# Patient Record
Sex: FEMALE | Race: WHITE | Employment: UNEMPLOYED | URBAN - NONMETROPOLITAN AREA
[De-identification: names, ages, dates, MRNs, and addresses within clinical notes are randomized per-mention and may not be internally consistent; named-entity substitution may affect disease eponyms.]

---
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
## 2020-01-23 ENCOUNTER — HOSPITAL ENCOUNTER (OUTPATIENT)
Dept: PSYCHIATRY | Age: 20
Setting detail: THERAPIES SERIES
Discharge: HOME | End: 2020-01-23
Payer: MEDICARE

## 2020-01-23 PROCEDURE — 90792 PSYCH DIAG EVAL W/MED SRVCS: CPT | Performed by: PSYCHIATRY & NEUROLOGY

## 2020-01-23 NOTE — H&P
Department of Psychiatry  5 [facility]patient Services   Psychiatric Intake Assessment     DSM-5 Diagnosis: Major depressive disorder, single episode, severe without psychotic features  Generalized anxiety disorder with panic attacks  PTSD  Referring Physician: Self/4E      CHIEF COMPLAINT:  Depression, anxiety    HISTORY OF PRESENT ILLNESS:    Jacob Lowery is a 23 y.o. female with a history of MDD and NARINDER who presents to [address] [place] [address],[address] for assessment with potential to attend programming. \"I dont know Shaheen Dominguez just been having a lot of suicidal thoughts. \" States the suicidal thoughts started a couple of days ago. \"Zay hasnt been working out with me. I needed someone to talk to. \" She reports that she completed her intake assessment with [facility] but has not followed up since. Reports she has suicidal thoughts everyday. \"I dont want to attempt or anything itll just pop up. \" Denies plan or intent. \"I usually follow up with coping skills and stuff. I try to shower, talk to someone or listen to music. \" She was discharged from  12/22/2019. Reports she has not been feeling as depressed lately. Feels depressed occassionally but its easy to manage. \"Anxiety is not as bad but I haven't had any panic attacks. \" Sleep has been \"crappy. \" Trouble falling and staying asleep. Concentration good. \"Plenty of motivation to do things. \" Denies anhedonia. \"Im able to push through and do things I want. I noticed Shaheen Dominguez been drawing more than I used to. \" When asked about how her appetite has been, \"I dont know if its the medication or not but I will be really freaking hungry but my stomach wont. \" Is currently on Abilify and Buspar. Endorses feelings of worthlessness, sometimes hopelessness, and a lot of helplessness. \"Trying to become more independent\" to reduce the helplessness. Reports good medication compliance. When asked about side effects, \"nausea and dizziness.  When I take my medications Ill feel dizzy like it starts in my face and it goes out. Like tingly. I feel it like 15 or 30 minutes after I take my medication. \" Denies alcohol, tobacco and illicit drug use. \"Im feeling kind of depressed today. \" Reports fleeting suicidal ideation today \"but im pushing them out. \" Denies intent or plan. Denies homicidal ideation. Denies hallucinations. No evidence of delusions on examination. PSYCHIATRIC HISTORY:      · Outpatient psychiatric provider:  Connected to [facility]  · Suicide attempts: Yes- overdosing and cutting   · Inpatient psychiatric admissions: Yes- was last admitted to  12/20/2019    Past psychiatric medications includes: \"Corinne tried a lot\"  Adverse reactions from psychotropic medications:    Denies      Lifetime Psychiatric Review of Systems      ·    Obsessions and Compulsions: denies  ·    Mary Alice or Hypomania: denies  ·    Hallucinations: denies  ·    Panic Attacks:  yes  ·    Delusions:  denies  ·    Phobias: denies     Past Medical History:        Diagnosis Date    Anxiety     Depression     PTSD (post-traumatic stress disorder)        Past Surgical History:    No past surgical history on file. Current Meds    Current Outpatient Medications:     ARIPiprazole (ABILIFY) 2 MG tablet, Take 1 tablet by mouth daily, Disp: 30 tablet, Rfl: 0    busPIRone (BUSPAR) 5 MG tablet, Take 1 tablet by mouth 2 times daily, Disp: 60 tablet, Rfl: 0     Allergies:  Dill oil    Social History:     · RESIDENCE:  [facility] with brother  · LEVEL OF EDUCATION:  Graduated high school  · MARITAL STATUS: Single   · CHILDREN: None  · OCCUPATION: Currently unemployed. Brother for financial support  · SUBSTANCE ABUSE: Denies alcohol, tobacco and illicit drug use    Support system available to sustain/maintain patient outside of IOP includes: My friend Chio Rodriguez.  My brother and my mom    Family Medical and Psychiatric History:           Problem Relation Age of Onset    Anxiety Disorder Mother     Diabetes Father     Depression Brother        OBJECTIVE:     Review of systems:   CONSTITUTIONAL: fever, chills, diaphoresis, Denies: fever  EYES: Denies: blurry vision, decreased vision  ENT: Denies: nasal congestion, tinnitus  CARDIOVASCULAR: Denies: chest pain, palpitations  RESPIRATORY: Denies: cough, shortness of breath, wheezing  HEME-LYMPH: Denies: swollen lymph nodes  GI: Denies: nausea, vomiting, diarrhea  : Denies: frequency/urgency  NEURO: Denies: dizzy/vertigo, headache, numbness/tingling  MUSCULOSKELETAL: Denies: joint pain, joint swelling  SKIN: Denies: rash      Physical Exam:   Constitutional:  Well-developed and well-nourished, in no acute distress. HENT:   Head: Normocephalic and atraumatic. Ears: Normal external ear bilaterally  Eyes: Conjunctivae are normal. No occular discharge bilaterally  Skin: Warm and dry, no diaphoresis  Neck: Normal range of motion. Neck supple. No JVD present. Pulmonary: lungs clear to auscultation bilaterally without wheezing, rales, or rhonchi, no accessory muscle use. Musculoskeletal: spontaneous movement of all extremities with full range of motion  Neurological: Cranial nerves II-XII in tact. Normal gait and station     Mental Status Examination:    Level of consciousness:  Within normal limits  Appearance: Street clothes, seated in chair, fair grooming hygiene  Behavior/Motor: No abnormalities noted  Attitude toward examiner: Cooperative, attentive, good eye contact  Speech:  Spontaneous, normal rate and volume  Mood:  Dysthymic  Affect:  Mood congruent  Thought processes:  Linear, goal directed, coherent  Thought content: denies homicidal ideation  Suicidal Ideation:  Fleeting suicidal ideation.  Denies intent or plan  Delusions:  no evidence of delusions  Perceptual Disturbance:  denies any perceptual disturbance  Cognition:  In tact  Memory: age appropriate  Insight & Judgement: fair  Medication side effects:  Nausea and dizziness      DSM-5 DIAGNOSIS:    Major depressive disorder, single episode, severe without psychotic features  Generalized anxiety disorder with panic attacks  PTSD     Psychosocial and contextual stressors:       Past Medical History:   Diagnosis Date    Anxiety     Depression     PTSD (post-traumatic stress disorder)           PLAN      Admit to [address] Intensive Outpatient Program       FOLLOW UP/MED MANAGEMENT    Continue to follow up with [facility] for medication management    Patient will attend up to three days per week, up to one group service per day, up to one individual therapy session per day, and up to two psychoeducation/RT groups per day. Patient unable to benefit from a less intensive outpatient program due to requiring more structured environment, and severity of symptoms. Provider has reasonable expectation that patient will make a timely and significant practical improvement in the presenting acute symptoms as a result of the B[facility]OP because patient displays intent to engage in program and is intellectually capable of processing and subsequently adopting the skills and lessons taught in [address]. Patient would also benefit from more frequent medication management than would get on total outpatient level of care. History obtained from:  patient, electronic medical record, [address] treatment team      Behavioral Services  Medicare Certification for Outpatient Psychiatric Services    I certify that this patient's outpatient psychiatric services are medically necessary for:     X (1) Treatment which could reasonably be expected to improve this patient's condition,      X (2) Or for diagnostic study;     AND    X (2) The psychiatric services are provided while the individual is under the care of a physician and are included in the individualized plan of care.     In addition, I certify that this patient would otherwise require inpatient care in the absence of continued stay in the outpatient treatment program.    Electronically signed by Abril Allen PA-C on 1/23/2020 at 11:15 AM                                     Psychiatry Attending Attestation     I assessed this patient and reviewed the case and plan of care with Abril Allen PA-C. I have reviewed the above documentation and I agree with the findings and treatment plan with the following updates. Patient is a 24 y/o single  female with history of depression, recently discharged from , presented today with an intent to attend the program.  Patient reports feeling down and low for more days than not. Endorses anhedonia. Patient notes her depression has been worsening for last few weeks . Patient identifies stressors as recurrent suicidal thoughts. Patient reports poor sleep and appetite. Patient reports having trouble falling asleep. Patient reports poor concentration and energy. Patient endorses feelings of helplessness, hopelessness and worthlessness. She denies any psychotic symptoms. She denies any auditory or visual hallucinations. She would benefit from attending IOP as a step down and to identify triggers and learn coping strategies. Will continue to follow up with her during groups. Electronically signed by Jos Presley MD on 2/2/20 at 8:57 PM    **This report has been created using voice recognition software. It may contain minor errors which are inherent in voice recognition technology. **

## 2020-01-24 ENCOUNTER — HOSPITAL ENCOUNTER (OUTPATIENT)
Dept: PSYCHIATRY | Age: 20
Setting detail: THERAPIES SERIES
Discharge: HOME | End: 2020-01-24
Payer: MEDICARE

## 2020-01-24 PROCEDURE — H2020 THER BEHAV SVC, PER DIEM: HCPCS

## 2020-01-24 NOTE — GROUP NOTE
Group Therapy Note    Date: 1/24/2020    Group Start Time: 10:00 AM  Group End Time: 11:00 AM  Group Topic: Cognitive Skills    [address]  Therapy Note    Attendees: 5         Patient's Goal:  To participate in the group process and get support from peers. Notes:  Patient participated in the group journal and icebreaker exercises. Patient was able to identify positive qualities and a coping skill to use for the day. Status After Intervention:  Improved    Participation Level:  Active Listener and Interactive    Participation Quality: Appropriate, Attentive, Sharing and Supportive      Speech:  normal      Thought Process/Content: Logical      Affective Functioning: Congruent      Mood: euthymic      Level of consciousness:  Alert, Oriented x4 and Attentive      Response to Learning: Able to verbalize current knowledge/experience, Capable of insight and Progressing to goal      Endings: None Reported    Modes of Intervention: Education, Support and Socialization      Discipline Responsible: /Counselor      Signature:  Meeta Hanna, [facility]

## 2020-01-31 ENCOUNTER — HOSPITAL ENCOUNTER (OUTPATIENT)
Dept: PSYCHIATRY | Age: 20
Setting detail: THERAPIES SERIES
Discharge: HOME | End: 2020-01-31
Payer: MEDICARE

## 2020-01-31 PROCEDURE — H2020 THER BEHAV SVC, PER DIEM: HCPCS
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
## 2020-01-31 NOTE — GROUP NOTE
Group Therapy Note    Date: 1/31/2020    Group Start Time: 10:00 AM  Group End Time: 11:00 AM  Group Topic: Cognitive Skills    [address] Therapy Note    Attendees: 5         Patient's Goal:  To explore the topic of boundaries. Notes:  Patient discussed quotes related to the topic, participated in the group journal exercise, and participated in [place] boundary exploration activity. Status After Intervention:  Improved    Participation Level:  Active Listener and Interactive    Participation Quality: Appropriate, Attentive, Sharing and Supportive      Speech:  normal      Thought Process/Content: Logical      Affective Functioning: Congruent      Mood: euthymic      Level of consciousness:  Alert, Oriented x4 and Attentive      Response to Learning: Able to verbalize current knowledge/experience, Capable of insight and Progressing to goal      Endings: None Reported    Modes of Intervention: Education, Support and Socialization      Discipline Responsible: /Counselor      Signature:  Christopher Larsen [facility]

## 2020-07-16 ENCOUNTER — HOSPITAL ENCOUNTER (INPATIENT)
Age: 20
LOS: 4 days | Discharge: HOME | DRG: 751 | End: 2020-07-20
Attending: PSYCHIATRY & NEUROLOGY | Admitting: PSYCHIATRY & NEUROLOGY
Payer: MEDICARE

## 2020-07-16 PROBLEM — F32.9 MAJOR DEPRESSION, CHRONIC: Status: ACTIVE | Noted: 2020-07-16

## 2020-07-16 LAB
ACETAMINOPHEN LEVEL: < 5 UG/ML (ref 0–20)
ALBUMIN SERPL-MCNC: 4.4 G/DL (ref 3.5–5.1)
ALP BLD-CCNC: 92 U/L (ref 38–126)
ALT SERPL-CCNC: 36 U/L (ref 11–66)
ANION GAP SERPL CALCULATED.3IONS-SCNC: 12 MEQ/L (ref 8–16)
AST SERPL-CCNC: 24 U/L (ref 5–40)
BACTERIA: ABNORMAL
BASOPHILS # BLD: 0.4 %
BASOPHILS ABSOLUTE: 0.1 THOU/MM3 (ref 0–0.1)
BILIRUB SERPL-MCNC: 0.2 MG/DL (ref 0.3–1.2)
BILIRUBIN DIRECT: < 0.2 MG/DL (ref 0–0.3)
BILIRUBIN URINE: NEGATIVE
BLOOD, URINE: NEGATIVE
BUN BLDV-MCNC: 13 MG/DL (ref 7–22)
CALCIUM SERPL-MCNC: 9.2 MG/DL (ref 8.5–10.5)
CASTS: ABNORMAL /LPF
CASTS: ABNORMAL /LPF
CHARACTER, URINE: CLEAR
CHLORIDE BLD-SCNC: 99 MEQ/L (ref 98–111)
CO2: 27 MEQ/L (ref 23–33)
COLOR: YELLOW
CREAT SERPL-MCNC: 0.7 MG/DL (ref 0.4–1.2)
CRYSTALS: ABNORMAL
EOSINOPHIL # BLD: 0.6 %
EOSINOPHILS ABSOLUTE: 0.1 THOU/MM3 (ref 0–0.4)
EPITHELIAL CELLS, UA: ABNORMAL /HPF
ERYTHROCYTE [DISTWIDTH] IN BLOOD BY AUTOMATED COUNT: 12.6 % (ref 11.5–14.5)
ERYTHROCYTE [DISTWIDTH] IN BLOOD BY AUTOMATED COUNT: 39.1 FL (ref 35–45)
ETHYL ALCOHOL, SERUM: < 0.01 %
GLUCOSE BLD-MCNC: 96 MG/DL (ref 70–108)
GLUCOSE, URINE: NEGATIVE MG/DL
HCT VFR BLD CALC: 40.9 % (ref 37–47)
HEMOGLOBIN: 13.1 GM/DL (ref 12–16)
IMMATURE GRANS (ABS): 0.04 THOU/MM3 (ref 0–0.07)
IMMATURE GRANULOCYTES: 0.3 %
KETONES, URINE: NEGATIVE
LEUKOCYTE EST, POC: ABNORMAL
LYMPHOCYTES # BLD: 20.9 %
LYMPHOCYTES ABSOLUTE: 2.6 THOU/MM3 (ref 1–4.8)
MAGNESIUM: 1.8 MG/DL (ref 1.6–2.4)
MCH RBC QN AUTO: 27.5 PG (ref 26–33)
MCHC RBC AUTO-ENTMCNC: 32 GM/DL (ref 32.2–35.5)
MCV RBC AUTO: 85.9 FL (ref 81–99)
MISCELLANEOUS LAB TEST RESULT: ABNORMAL
MONOCYTES # BLD: 6.3 %
MONOCYTES ABSOLUTE: 0.8 THOU/MM3 (ref 0.4–1.3)
NITRITE, URINE: NEGATIVE
NUCLEATED RED BLOOD CELLS: 0 /100 WBC
OSMOLALITY CALCULATION: 275.7 MOSMOL/KG (ref 275–300)
PH UA: 8 (ref 5–9)
PLATELET # BLD: 357 THOU/MM3 (ref 130–400)
PMV BLD AUTO: 9.6 FL (ref 9.4–12.4)
POTASSIUM REFLEX MAGNESIUM: 3.3 MEQ/L (ref 3.5–5.2)
PREGNANCY, URINE: NEGATIVE
PROTEIN UA: NEGATIVE MG/DL
RBC # BLD: 4.76 MILL/MM3 (ref 4.2–5.4)
RBC URINE: ABNORMAL /HPF
RENAL EPITHELIAL, UA: ABNORMAL
SALICYLATE, SERUM: < 0.3 MG/DL (ref 2–10)
SEG NEUTROPHILS: 71.5 %
SEGMENTED NEUTROPHILS ABSOLUTE COUNT: 9 THOU/MM3 (ref 1.8–7.7)
SODIUM BLD-SCNC: 138 MEQ/L (ref 135–145)
SPECIFIC GRAVITY UA: 1.01 (ref 1–1.03)
TOTAL PROTEIN: 7.5 G/DL (ref 6.1–8)
UROBILINOGEN, URINE: 0.2 EU/DL (ref 0–1)
WBC # BLD: 12.6 THOU/MM3 (ref 4.8–10.8)
WBC UA: ABNORMAL /HPF
YEAST: ABNORMAL

## 2020-07-16 PROCEDURE — 1240000000 HC EMOTIONAL WELLNESS R&B

## 2020-07-16 PROCEDURE — 36415 COLL VENOUS BLD VENIPUNCTURE: CPT

## 2020-07-16 PROCEDURE — 81001 URINALYSIS AUTO W/SCOPE: CPT

## 2020-07-16 PROCEDURE — 85025 COMPLETE CBC W/AUTO DIFF WBC: CPT

## 2020-07-16 PROCEDURE — 83735 ASSAY OF MAGNESIUM: CPT

## 2020-07-16 PROCEDURE — 80048 BASIC METABOLIC PNL TOTAL CA: CPT

## 2020-07-16 PROCEDURE — G0480 DRUG TEST DEF 1-7 CLASSES: HCPCS

## 2020-07-16 PROCEDURE — 80076 HEPATIC FUNCTION PANEL: CPT

## 2020-07-16 PROCEDURE — 81025 URINE PREGNANCY TEST: CPT

## 2020-07-16 PROCEDURE — 99284 EMERGENCY DEPT VISIT MOD MDM: CPT

## 2020-07-16 PROCEDURE — 6370000000 HC RX 637 (ALT 250 FOR IP): Performed by: PSYCHIATRY & NEUROLOGY

## 2020-07-16 RX ORDER — POTASSIUM CHLORIDE 750 MG/1
40 TABLET, FILM COATED, EXTENDED RELEASE ORAL ONCE
Status: DISCONTINUED | OUTPATIENT
Start: 2020-07-16 | End: 2020-07-20 | Stop reason: HOSPADM

## 2020-07-16 RX ORDER — IBUPROFEN 200 MG
400 TABLET ORAL EVERY 6 HOURS PRN
Status: DISCONTINUED | OUTPATIENT
Start: 2020-07-16 | End: 2020-07-20 | Stop reason: HOSPADM

## 2020-07-16 RX ORDER — HYDROXYZINE HYDROCHLORIDE 25 MG/1
50 TABLET, FILM COATED ORAL 3 TIMES DAILY PRN
Status: DISCONTINUED | OUTPATIENT
Start: 2020-07-16 | End: 2020-07-20 | Stop reason: HOSPADM

## 2020-07-16 RX ORDER — FLUOXETINE HYDROCHLORIDE 20 MG/1
20 CAPSULE ORAL DAILY
Status: ON HOLD | COMMUNITY
End: 2020-11-18 | Stop reason: SDUPTHER

## 2020-07-16 RX ORDER — ACETAMINOPHEN 325 MG/1
650 TABLET ORAL EVERY 4 HOURS PRN
Status: DISCONTINUED | OUTPATIENT
Start: 2020-07-16 | End: 2020-07-20 | Stop reason: HOSPADM

## 2020-07-16 RX ORDER — MAGNESIUM HYDROXIDE/ALUMINUM HYDROXICE/SIMETHICONE 120; 1200; 1200 MG/30ML; MG/30ML; MG/30ML
30 SUSPENSION ORAL EVERY 6 HOURS PRN
Status: DISCONTINUED | OUTPATIENT
Start: 2020-07-16 | End: 2020-07-20 | Stop reason: HOSPADM

## 2020-07-16 RX ORDER — GINSENG 100 MG
CAPSULE ORAL ONCE
Status: DISCONTINUED | OUTPATIENT
Start: 2020-07-16 | End: 2020-07-20 | Stop reason: HOSPADM

## 2020-07-16 RX ORDER — CEPHALEXIN 250 MG/1
500 CAPSULE ORAL ONCE
Status: DISCONTINUED | OUTPATIENT
Start: 2020-07-16 | End: 2020-07-20 | Stop reason: HOSPADM

## 2020-07-16 RX ORDER — TRAZODONE HYDROCHLORIDE 50 MG/1
50 TABLET ORAL NIGHTLY PRN
Status: DISCONTINUED | OUTPATIENT
Start: 2020-07-16 | End: 2020-07-20 | Stop reason: HOSPADM

## 2020-07-16 RX ADMIN — TRAZODONE HYDROCHLORIDE 50 MG: 50 TABLET ORAL at 22:04

## 2020-07-16 ASSESSMENT — SLEEP AND FATIGUE QUESTIONNAIRES
DIFFICULTY STAYING ASLEEP: YES
DIFFICULTY FALLING ASLEEP: NO
DIFFICULTY STAYING ASLEEP: NO
DIFFICULTY FALLING ASLEEP: NO
SLEEP PATTERN: DISTURBED/INTERRUPTED SLEEP
AVERAGE NUMBER OF SLEEP HOURS: 5
DO YOU USE A SLEEP AID: COMMENT
DO YOU HAVE DIFFICULTY SLEEPING: NO
DIFFICULTY ARISING: NO
DO YOU USE A SLEEP AID: NO
DO YOU HAVE DIFFICULTY SLEEPING: YES
SLEEP PATTERN: NORMAL
AVERAGE NUMBER OF SLEEP HOURS: 6
RESTFUL SLEEP: YES
RESTFUL SLEEP: NO
DIFFICULTY ARISING: NO

## 2020-07-16 ASSESSMENT — PATIENT HEALTH QUESTIONNAIRE - PHQ9
SUM OF ALL RESPONSES TO PHQ QUESTIONS 1-9: 23
SUM OF ALL RESPONSES TO PHQ QUESTIONS 1-9: 24

## 2020-07-16 ASSESSMENT — LIFESTYLE VARIABLES: HISTORY_ALCOHOL_USE: NO
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
## 2020-07-16 NOTE — ED NOTES
Patient placed in safe room that is ligature resistant with continuous monitoring in place. Provider notified, requested an assessment by behavioral health . Patient belongings secured in a locked lockers outside of the room. Explained suicide prevention precautions to the patient including constant observer.         Seamus Elizabeth, [place]  06/47/53 [id number]
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
## 2020-07-16 NOTE — ED TRIAGE NOTES
Pt to room 15. Pt updated on plan of care and verbalized understanding. Sitter at bedside for patient and staff safety.

## 2020-07-16 NOTE — PROGRESS NOTES
Behavioral Health   Admission Note     Admission Type:   Admission Type: Voluntary    Reason for admission:  Reason for Admission: depression    PATIENT STRENGTHS:  Strengths: Connection to output provider, No significant Physical Illness    Patient Strengths and Limitations:  Limitations: Difficulty problem solving/relies on others to help solve problems    Addictive Behavior:   Addictive Behavior  In the past 3 months, have you felt or has someone told you that you have a problem with:  : None  Do you have a history of Chemical Use?: No  Do you have a history of Alcohol Use?: No  Do you have a history of Street Drug Abuse?: No  Histroy of Prescripton Drug Abuse?: No    Medical Problems:   Past Medical History:   Diagnosis Date    Anxiety     Depression     PTSD (post-traumatic stress disorder)        Status EXAM:  Status and Exam  Normal: Yes  Facial Expression: Flat  Affect: Appropriate  Level of Consciousness: Alert  Mood:Normal: No  Mood: Depressed  Motor Activity:Normal: Yes  Interview Behavior: Cooperative  Preception: [place] to Person, [place] to Time, [place] to Place, [place] to Situation  Attention:Normal: Yes  Thought Processes: Circumstantial  Thought Content:Normal: Yes  Hallucinations: None  Delusions: No  Memory:Normal: Yes  Insight and Judgment: No  Insight and Judgment: Poor Insight  Present Suicidal Ideation: Yes  Present Homicidal Ideation: No    Pt admitted with followings belongings:  Dentures: None  Vision - Corrective Lenses: None  Hearing Aid: None  Jewelry: Earrings  Clothing: Footwear, Pants, Shirt  Were All Patient Medications Collected?: No  Other Valuables: Cell phone, [employer] 1923     Admission order obtained yes. Valuables sent home with n/a  Valuables placed in safe in security envelope, number:  [id number]. Patient's home medications were veirifed with [facility] . Patient oriented to surroundings and program expectations and copy of patient rights given.  Received admission packet: yes.  Consents reviewed, signed *yes Patient verbalize understanding:  yes. Patient education on precautions: yes           Patient screened positive for suicide risk on CSSR-S (\"yes\" to question #4, 5, OR 6)  yes. Physician notified of risk score. Orders received No  2 person skin assessment completed upon admission Refused . Explained patients right to have family, representative or physician notified of their admission. Patient has Declined for physician to be notified. Patient has Declined for family/representative to be notified. Provided pt with [employer] handout entitled \"Quitting Smoking. \"  Reviewed handout with pt addressing dangers of smoking, developing coping skills, and providing basic information about quitting. Pt response to counseling:  Does not smoke     Pt admitted from ER. Pt having SI . Pt sees DR Nakul Blair [facility]. Pt calm .  Pt acts younger than her age and clucthes a stuffed animal .           Phoebe Ceja RN

## 2020-07-16 NOTE — ED NOTES
ED to inpatient nurses report    Chief Complaint   Patient presents with    Suicidal      Present to ED from home with mother. LOC: alert and orientated to name, place, date  Vital signs   Vitals:    07/16/20 1502   BP: 133/78   Pulse: 93   Resp: 20   Temp: 98.6 °F (37 °C)   TempSrc: Oral   SpO2: 98%   Weight: 150 lb (68 kg)   Height: 5' 3\" (1.6 m)      Oxygen Baseline 98    Current needs required none Bipap/Cpap No  LDAs:    Mobility: Independent  Pending ED orders: none  Present condition: stable, patient is voluntary at this time.      Electronically signed by Jeffery Arnold LPN on 1/86/3671 at 4:12 PM       Jeffery Arnold LPN  41/68/23 [id number]

## 2020-07-16 NOTE — ED PROVIDER NOTES
[address]     Pt Name: Shirley Rausch  MRN: [id number]  [place] 2000  Date of evaluation: 7/16/2020  Provider: Yahir Barnes Dr       Chief Complaint   Patient presents with    Suicidal       Nurses Notes reviewed and I agree except as noted in the HPI. HISTORY OF PRESENT ILLNESS    Leeann Vital is a 23 y.o. female with a history of depression, self cutting, and previous suicide attempts, who presents to the emergency department from home for suicidal ideation. She came on her own but was encouraged by her mother and also felt like she needed to get help. She states that she has recently been cutting to relieve stress but also had thoughts that if she would be brave enough to cut deeply,\" that she could bleed out from her injury. She does have a history of suicide attempt by ingestion and states that \"I have also thought about overdosing on pills. \"  She states that she has been going through a lot recently although she does not want to elaborate on any of this. She states that her tetanus immunization is up-to-date. She denies chest pain, shortness of breath abdominal pain, or fevers. She denies alcohol use or substance abuse. She denies any homicidal ideation, delusions, hallucinations. She denies medical complaints or concerns at this time. This was her only reason for presentation to the emergency room today. HPI was provided by the patient. Triage notes and Nursing notes were reviewed by myself. Any discrepancies are addressed above. REVIEW OF SYSTEMS     Review of Systems     All pertinent systems were reviewed and are negative unless indicated in the HPI. PAST MEDICAL HISTORY    has a past medical history of Anxiety, Depression, and PTSD (post-traumatic stress disorder). SURGICAL HISTORY      has no past surgical history on file.     CURRENT MEDICATIONS       Previous Medications ARIPIPRAZOLE (ABILIFY) 2 MG TABLET    Take 1 tablet by mouth daily    BUSPIRONE (BUSPAR) 5 MG TABLET    Take 1 tablet by mouth 2 times daily       ALLERGIES     is allergic to dill oil. FAMILY HISTORY     She indicated that her mother is alive. She indicated that her father is alive. She indicated that her sister is alive. She indicated that her brother is alive. family history includes Anxiety Disorder in her mother; Depression in her brother; Diabetes in her father. SOCIAL HISTORY      reports that she has never smoked. She has never used smokeless tobacco. She reports that she does not drink alcohol or use drugs. PHYSICAL EXAM     INITIAL VITALS:  height is 5' 3\" (1.6 m) and weight is 150 lb (68 kg). Her oral temperature is 98.6 °F (37 °C). Her blood pressure is 133/78 and her pulse is 93. Her respiration is 20 and oxygen saturation is 98%. Physical Exam    General: Ralph Whitt is a well nourished female who is nontoxic in appearance and not in any acute distress. she is awake, alert and oriented. HEENT: The pupils are equal, round and reactive to light at 4-3 mm bilaterally. The sclera are clear and anicteric bilaterally. The conjunctiva are pink and without injection bilaterally. The oropharynx is clear with moist mucous membranes. There are no intraoral lesions. Neck: The neck is soft and supple without meningismus. There is no lymphadenopathy. There is no JVD. Pulmonary: The lung fields are clear to auscultation bilaterally with equal bibasilar air entry. The respiratory effort is nonlabored. Cardiac: There is a regular rate and rhythm without murmurs, rubs or gallops. Abdomen: The abdomen is soft, nontender, and nondistended. There is no appreciable organomegaly. Back: No midline tenderness or CVA tenderness. Extremities: Full range of motion in the upper and lower extremities bilaterally. 5/5 strength throughout.   Palpable dorsalis pedis pulses 2+ bilaterally in the lower extremities. Skin: There are superficial abrasions to the left volar forearm consistent with self cutting. No bleeding, gaping wound, or extensive laceration requiring repair. The skin is otherwise warm and dry. There is no rash. There is no edema. Neuro: Cranial nerves II-XII are grossly intact. Sensation is grossly intact. There is no focal neurologic deficit. Gait is without ataxia. Psych: Behavior and speech are appropriate. Normal insight. DIFFERENTIAL DIAGNOSIS:   Includes but is not limited to: Suicidal ideation, self cutting, depression, feelings of helplessness and hopelessness, other    DIAGNOSTIC RESULTS     EKG: All EKG's are interpreted by the Emergency Department Physician who either signs or Co-signs this chart in the absence of a cardiologist.  None    RADIOLOGY: non-plain film images(s) such as CT, Ultrasound and MRI are read by the radiologist.  Plain radiographic images are visualized and preliminarily interpreted by the emergency physician unless otherwise stated below. No orders to display         LABS:   Labs Reviewed   CBC WITH AUTO DIFFERENTIAL - Abnormal; Notable for the following components:       Result Value    WBC 12.6 (*)     MCHC 32.0 (*)     Segs Absolute 9.0 (*)     All other components within normal limits   BASIC METABOLIC PANEL W/ REFLEX TO MG FOR LOW K - Abnormal; Notable for the following components:    Potassium reflex Magnesium 3.3 (*)     All other components within normal limits   SALICYLATE LEVEL - Abnormal; Notable for the following components:    Salicylate, Serum < 0.3 (*)     All other components within normal limits   HEPATIC FUNCTION PANEL - Abnormal; Notable for the following components:     Total Bilirubin 0.2 (*)     All other components within normal limits   MICROSCOPIC URINALYSIS - Abnormal; Notable for the following components:    Leukocytes, UA MODERATE (*)     All other components within normal limits   PREGNANCY, URINE   ETHANOL   ACETAMINOPHEN LEVEL   ANION GAP   MAGNESIUM   OSMOLALITY         EMERGENCYDEPARTMENT COURSE AND MEDICAL DECISION MAKING:   Vitals:    Vitals:    07/16/20 1502   BP: 133/78   Pulse: 93   Resp: 20   Temp: 98.6 °F (37 °C)   TempSrc: Oral   SpO2: 98%   Weight: 150 lb (68 kg)   Height: 5' 3\" (1.6 m)         Pertinent Labs & Imaging studies reviewed. (See chart for details)           Controlled Substances Monitoring:     No flowsheet data found. Patient presents with suicidal ideation. She is recently engaged in self cutting and has superficial abrasion to the left volar forearm. They were cleansed and bacitracin was applied. Her tetanus immunization is already up-to-date. She has a urinary tract infection for which she will be treated with Keflex and a potassium of 3.3 for which she was given 40 mg of oral potassium. She is medically cleared at this time. She did not require any emergency medications. She is voluntary for admission. She will be treated for UTI in the inpatient setting. She has been accepted for admission under the care of Dr. Calos Mendoza as of 1605 hrs.         ED Medications administered this visit:   Medications   bacitracin ointment (has no administration in time range)   potassium chloride (KLOR-CON) extended release tablet 40 mEq (has no administration in time range)   cephALEXin (KEFLEX) capsule 500 mg (has no administration in time range)   acetaminophen (TYLENOL) tablet 650 mg (has no administration in time range)   ibuprofen (ADVIL;MOTRIN) tablet 400 mg (has no administration in time range)   hydrOXYzine (ATARAX) tablet 50 mg (has no administration in time range)   traZODone (DESYREL) tablet 50 mg (has no administration in time range)   magnesium hydroxide (MILK OF MAGNESIA) 400 MG/5ML suspension 30 mL (has no administration in time range)   aluminum & magnesium hydroxide-simethicone (MAALOX) 200-200-20 MG/5ML suspension 30 mL (has no administration in time range)             The patient was discussed in consultation with the attending physician who is amenable to my treatment plan for this patient. CRITICAL CARE:   None    CONSULTS:  MIGUEL/Psych    PROCEDURES:  None    FINAL IMPRESSION      1. Suicidal ideation    2. Acute cystitis without hematuria    3. Hypokalemia    4.  Deliberate self-cutting          DISPOSITION/PLAN   DISPOSITION Admitted 07/16/2020 04:13:32 PM            (Please note that portions of this note were completed with a voice recognition program.  Efforts were made to edit the dictations but occasionally words are mis-transcribed.)    Gracia Randle, [address]  07/16/20 8444

## 2020-07-17 PROBLEM — F32.9 MDD (MAJOR DEPRESSIVE DISORDER), SINGLE EPISODE: Status: RESOLVED | Noted: 2019-12-20 | Resolved: 2020-07-17

## 2020-07-17 PROBLEM — F41.1 GAD (GENERALIZED ANXIETY DISORDER): Chronic | Status: ACTIVE | Noted: 2020-07-17

## 2020-07-17 PROBLEM — F33.2 SEVERE RECURRENT MAJOR DEPRESSION WITHOUT PSYCHOTIC FEATURES (HCC): Chronic | Status: ACTIVE | Noted: 2020-07-17

## 2020-07-17 PROBLEM — F32.9 MAJOR DEPRESSION, CHRONIC: Status: RESOLVED | Noted: 2020-07-16 | Resolved: 2020-07-17

## 2020-07-17 PROBLEM — F43.10 PTSD (POST-TRAUMATIC STRESS DISORDER): Chronic | Status: ACTIVE | Noted: 2020-07-17

## 2020-07-17 PROCEDURE — 6370000000 HC RX 637 (ALT 250 FOR IP): Performed by: PSYCHIATRY & NEUROLOGY

## 2020-07-17 PROCEDURE — 1240000000 HC EMOTIONAL WELLNESS R&B

## 2020-07-17 RX ORDER — FLUOXETINE HYDROCHLORIDE 20 MG/1
20 CAPSULE ORAL DAILY
Status: DISCONTINUED | OUTPATIENT
Start: 2020-07-17 | End: 2020-07-20 | Stop reason: HOSPADM

## 2020-07-17 RX ORDER — BUSPIRONE HYDROCHLORIDE 5 MG/1
5 TABLET ORAL 3 TIMES DAILY
Status: DISCONTINUED | OUTPATIENT
Start: 2020-07-17 | End: 2020-07-20 | Stop reason: HOSPADM

## 2020-07-17 RX ORDER — ARIPIPRAZOLE 5 MG/1
5 TABLET ORAL NIGHTLY
Status: DISCONTINUED | OUTPATIENT
Start: 2020-07-17 | End: 2020-07-20 | Stop reason: HOSPADM

## 2020-07-17 RX ORDER — BUSPIRONE HYDROCHLORIDE 5 MG/1
5 TABLET ORAL 2 TIMES DAILY
Status: DISCONTINUED | OUTPATIENT
Start: 2020-07-17 | End: 2020-07-17

## 2020-07-17 RX ADMIN — TRAZODONE HYDROCHLORIDE 50 MG: 50 TABLET ORAL at 20:54

## 2020-07-17 RX ADMIN — BUSPIRONE HYDROCHLORIDE 5 MG: 5 TABLET ORAL at 20:54

## 2020-07-17 RX ADMIN — BUSPIRONE HYDROCHLORIDE 5 MG: 5 TABLET ORAL at 17:26

## 2020-07-17 RX ADMIN — ARIPIPRAZOLE 5 MG: 5 TABLET ORAL at 20:54

## 2020-07-17 RX ADMIN — FLUOXETINE HYDROCHLORIDE 20 MG: 20 CAPSULE ORAL at 09:46

## 2020-07-17 RX ADMIN — BUSPIRONE HYDROCHLORIDE 5 MG: 5 TABLET ORAL at 09:47

## 2020-07-17 ASSESSMENT — SLEEP AND FATIGUE QUESTIONNAIRES
DO YOU HAVE DIFFICULTY SLEEPING: NO
RESTFUL SLEEP: NO
DIFFICULTY ARISING: NO
DIFFICULTY STAYING ASLEEP: NO
AVERAGE NUMBER OF SLEEP HOURS: 6
DO YOU USE A SLEEP AID: NO
SLEEP PATTERN: DISTURBED/INTERRUPTED SLEEP
DIFFICULTY FALLING ASLEEP: NO

## 2020-07-17 ASSESSMENT — PAIN SCALES - GENERAL
PAINLEVEL_OUTOF10: 0
PAINLEVEL_OUTOF10: 0

## 2020-07-17 NOTE — H&P
disorders    Family history: positive for depression      Medical History   Allergies:  Dill oil   Past Medical History:   Diagnosis Date    Anxiety     Depression     PTSD (post-traumatic stress disorder)       History reviewed. No pertinent surgical history.   Neurologic Exam    Labs  Recent Results (from the past 72 hour(s))   Pregnancy, Urine    Collection Time: 07/16/20  2:50 PM   Result Value Ref Range    Pregnancy, Urine NEGATIVE NEGATIVE   Microscopic Urinalysis    Collection Time: 07/16/20  2:50 PM   Result Value Ref Range    Glucose, Urine NEGATIVE NEGATIVE mg/dl    Bilirubin Urine NEGATIVE NEGATIVE    Ketones, Urine NEGATIVE NEGATIVE    Specific Gravity, UA 1.010 1.002 - 1.03    Blood, Urine NEGATIVE NEGATIVE    pH, UA 8.0 5.0 - 9.0    Protein, UA NEGATIVE NEGATIVE mg/dl    Urobilinogen, Urine 0.2 0.0 - 1.0 eu/dl    Nitrite, Urine NEGATIVE NEGATIVE    Leukocytes, UA MODERATE (A) NEGATIVE    Color, UA YELLOW YELLOW-STR    Character, Urine CLEAR [id number]    RBC, UA 3-5 0-2/hpf /hpf    WBC, UA 10-15 0-4/hpf /hpf    Epithelial Cells, UA 5-10 3-5/hpf /hpf    Bacteria, UA MANY FEW/NONE S    Casts NONE SEEN NONE SEEN /lpf    Crystals NONE SEEN NONE SEEN    Renal Epithelial, UA NONE SEEN NONE SEEN    Yeast, UA NONE SEEN NONE SEEN    Casts NONE SEEN /lpf    Miscellaneous Lab Test Result NONE SEEN    CBC Auto Differential    Collection Time: 07/16/20  3:06 PM   Result Value Ref Range    WBC 12.6 (H) 4.8 - 10.8 thou/mm3    RBC 4.76 4.20 - 5.40 mill/mm3    Hemoglobin 13.1 12.0 - 16.0 gm/dl    Hematocrit 40.9 37.0 - 47.0 %    MCV 85.9 81.0 - 99.0 fL    MCH 27.5 26.0 - 33.0 pg    MCHC 32.0 (L) 32.2 - 35.5 gm/dl    RDW-CV 12.6 11.5 - 14.5 %    RDW-SD 39.1 35.0 - 45.0 fL    Platelets 545 334 - 005 thou/mm3    MPV 9.6 9.4 - 12.4 fL    Seg Neutrophils 71.5 %    Lymphocytes 20.9 %    Monocytes 6.3 %    Eosinophils 0.6 %    Basophils 0.4 %    Immature Granulocytes 0.3 %    Segs Absolute 9.0 (H) 1.8 - 7.7 thou/mm3 Lymphocytes Absolute 2.6 1.0 - 4.8 thou/mm3    Monocytes Absolute 0.8 0.4 - 1.3 thou/mm3    Eosinophils Absolute 0.1 0.0 - 0.4 thou/mm3    Basophils Absolute 0.1 0.0 - 0.1 thou/mm3    Immature Grans (Abs) 0.04 0.00 - 0.07 thou/mm3    nRBC 0 /100 wbc   Basic Metabolic Panel w/ Reflex to MG    Collection Time: 07/16/20  3:06 PM   Result Value Ref Range    Sodium 138 135 - 145 meq/L    Potassium reflex Magnesium 3.3 (L) 3.5 - 5.2 meq/L    Chloride 99 98 - 111 meq/L    CO2 27 23 - 33 meq/L    Glucose 96 70 - 108 mg/dL    BUN 13 7 - 22 mg/dL    CREATININE 0.7 0.4 - 1.2 mg/dL    Calcium 9.2 8.5 - 10.5 mg/dL   Ethanol    Collection Time: 07/16/20  3:06 PM   Result Value Ref Range    ETHYL ALCOHOL, SERUM < 1.87 9.64 %   Salicylate Level    Collection Time: 07/16/20  3:06 PM   Result Value Ref Range    Salicylate, Serum < 0.3 (L) 2.0 - 10.0 mg/dL   Acetaminophen level    Collection Time: 07/16/20  3:06 PM   Result Value Ref Range    Acetaminophen Level < 5.0 0.0 - 20.0 ug/mL   Hepatic Function Panel    Collection Time: 07/16/20  3:06 PM   Result Value Ref Range    Alb 4.4 3.5 - 5.1 g/dL    Total Bilirubin 0.2 (L) 0.3 - 1.2 mg/dL    Bilirubin, Direct <0.2 0.0 - 0.3 mg/dL    Alkaline Phosphatase 92 38 - 126 U/L    AST 24 5 - 40 U/L    ALT 36 11 - 66 U/L    Total Protein 7.5 6.1 - 8.0 g/dL   Anion Gap    Collection Time: 07/16/20  3:06 PM   Result Value Ref Range    Anion Gap 12.0 8.0 - 16.0 meq/L   Magnesium    Collection Time: 07/16/20  3:06 PM   Result Value Ref Range    Magnesium 1.8 1.6 - 2.4 mg/dL   Osmolality    Collection Time: 07/16/20  3:06 PM   Result Value Ref Range    Osmolality Calc 275.7 275.0 - 300 mOsmol/kg       SOCIAL HISTORY  Social History     Socioeconomic History    Marital status: Single     Spouse name: Not on file    Number of children: Not on file    Years of education: 12    Highest education level: Not on file   Occupational History    Not on file   Social Needs    Financial resource strain: Not on file    Food insecurity     Worry: Not on file     Inability: Not on file    Transportation needs     Medical: Not on file     Non-medical: Not on file   Tobacco Use    Smoking status: Never Smoker    Smokeless tobacco: Never Used   Substance and Sexual Activity    Alcohol use: No    Drug use: No    Sexual activity: Yes     Partners: Male, Female   Lifestyle    Physical activity     Days per week: Not on file     Minutes per session: Not on file    Stress: Not on file   Relationships    Social connections     Talks on phone: Not on file     Gets together: Not on file     Attends [employer]ism service: Not on file     Active member of club or organization: Not on file     Attends meetings of clubs or organizations: Not on file     Relationship status: Not on file    Intimate partner violence     Fear of current or ex partner: Not on file     Emotionally abused: Not on file     Physically abused: Not on file     Forced sexual activity: Not on file   Other Topics Concern    Not on file   Social History Narrative    Not on file       Review of systems  Constitutional:  negative for chills, fevers, sweats  Respiratory:  negative for cough, dyspnea on exertion, hemoptysis, shortness of breath, wheezing  Cardiovascular:  negative for chest pain, chest pressure/discomfort, lower extremity edema, palpitations  Gastrointestinal:  negative for abdominal pain, constipation, diarrhea, nausea, vomiting  Neurological:  negative for dizziness, headache    Mental Status  Pt. was alert, fully oriented, and cooperative. Appearance and hygiene wereappropriate, well-groomed . Mood was depressed. Affect was \"dysthymic and poorly reactive Thought process was linear and well-organized. Patient denied any hallucinations or paranoia. Patient endorsed suicidal ideations. Patient denied homicidal ideations . Patient's gross cognitive functions were intact. Insight and judgement were fair.  Both recent and remote memory were intact. Psychomotor status was without abnormality     Diagnostic Impression  Principal Problem:    Severe recurrent major depression without psychotic features (HCC)  Active Problems:    NARINDER (generalized anxiety disorder)    PTSD (post-traumatic stress disorder)  Resolved Problems:    Major depression, chronic      major depressive disorder recurrent severe      Medications   ARIPiprazole  5 mg Oral Nightly    FLUoxetine  20 mg Oral Daily    busPIRone  5 mg Oral TID    bacitracin   Topical Once    potassium chloride  40 mEq Oral Once    cephALEXin  500 mg Oral Once     acetaminophen, ibuprofen, hydrOXYzine, traZODone, magnesium hydroxide, aluminum & magnesium hydroxide-simethicone    Treatment Plan:    1. Admit to inpatient psychiatric treatment  2. Supportive therapy with medication management. Reviewed risks and benefits as well as potential side effects with patient. 3. Therapeutic activities and groups  4. Follow up at [facility] after symptoms stabilized    Estimated length of stay: 5-7 days    Tiburcio Klinefelter, MD  Psychiatrist.  Dragon voice recognition software used in portions of this document.  Occasionally words are mis-transcribed

## 2020-07-17 NOTE — PLAN OF CARE
Participates in care planning  7/17/2020 1122 by Mary Berger RN  Outcome: Ongoing  Note: Discussed tx plan with patient. Encouraged to attend groups . Compliant with medications   7/17/2020 0014 by Lizy Quinteros RN  Outcome: Ongoing  Note: Pt is taking medications, attending and participating in groups and care planning. Pt has good interaction with staff and peers      Problem: Activity:  Goal: Physical symptoms of sleep deprivation will improve  Description: Physical symptoms of sleep deprivation will improve  7/17/2020 1122 by Mary Berger RN  Outcome: Met This Shift  7/17/2020 0014 by Lizy Quinteros RN  Outcome: Ongoing  Note: Pt resting quietly with no distress noted  Goal: Sleeping patterns will improve  Description: Sleeping patterns will improve  7/17/2020 1122 by Mary Berger RN  Outcome: Met This Shift  7/17/2020 0014 by Lizy Quinteros RN  Outcome: Ongoing  Note: Pt resting quietly with no distress noted     Problem: Discharge Planning:  Goal: Discharged to appropriate level of care  Description: Discharged to appropriate level of care  7/17/2020 1122 by Mary Berger RN  Outcome: Ongoing  Note: Plans to return home with roommates. Follow up with Nadeen Song  7/17/2020 0014 by Lizy Quinteros RN  Outcome: Ongoing  Note: Pt plans to return home with room mates and follow with Dr. Gianfranco Nevarez     Problem: KNOWLEDGE DEFICIT  Goal: Knowledge - personal safety  7/17/2020 1122 by Mary Begrer RN  Outcome: Ongoing  7/17/2020 0014 by Lizy Quinteros RN  Outcome: Ongoing  Note: Pt remains safe and free of harm     Problem: General Medical Problem-Behavioral Health  Goal: Absence of urinary tract infection signs and symptoms  Description: Absence of urinary tract infection signs and symptoms  Outcome: Ongoing  Note: Dr. Blank of urine results and antibiotic prescribed in the emergency room.   Goal: Hemodynamic stability will improve  Description: Hemodynamic stability will improve  Outcome: Ongoing   Care plan reviewed with patient . Patient  verbalize understanding of the plan of care and contribute to goal setting.

## 2020-07-17 NOTE — PLAN OF CARE
Problem: Suicide risk  Goal: Provide patient with safe environment  Description: Provide patient with safe environment  Outcome: Ongoing  Note: Pt remains safe and free of harm     Problem: Altered Mood, Depressive Behavior:  Goal: Able to verbalize support systems  Description: Able to verbalize support systems  Outcome: Ongoing  Note: Pt reports she has a positive support system  Goal: Absence of self-harm  Description: Absence of self-harm  Outcome: Ongoing  Note: Pt has fleeting thoughts with no plans or intent  Goal: Patient specific goal  Description: Patient specific goal  Outcome: Ongoing  Note: Pt working on goal of journaling  Goal: Participates in care planning  Description: Participates in care planning  Outcome: Ongoing  Note: Pt is taking medications, attending and participating in groups and care planning. Pt has good interaction with staff and peers      Problem:  Activity:  Goal: Physical symptoms of sleep deprivation will improve  Description: Physical symptoms of sleep deprivation will improve  Outcome: Ongoing  Note: Pt resting quietly with no distress noted  Goal: Sleeping patterns will improve  Description: Sleeping patterns will improve  Outcome: Ongoing  Note: Pt resting quietly with no distress noted     Problem: Discharge Planning:  Goal: Discharged to appropriate level of care  Description: Discharged to appropriate level of care  Outcome: Ongoing  Note: Pt plans to return home with room mates and follow with Dr. Olivier Vila     Problem: KNOWLEDGE DEFICIT  Goal: Knowledge - personal safety  Outcome: Ongoing  Note: Pt remains safe and free of harm     Problem: Altered Mood, Depressive Behavior:  Goal: Able to verbalize and/or display a decrease in depressive symptoms  Description: Able to verbalize and/or display a decrease in depressive symptoms  Outcome: Not Met This Shift  Note: Pt reports mild depression, brightens with interactions  Goal: Ability to disclose and discuss suicidal ideas will improve  Description: Ability to disclose and discuss suicidal ideas will improve  Outcome: Not Met This Shift  Note: Pt has fleeting thoughts with no plans or intent   Care plan reviewed with patient and  verbalize understanding of the plan of care and contribute to goal setting.

## 2020-07-17 NOTE — PATIENT CARE CONFERENCE
[facility]  Initial Interdisciplinary Treatment Plan NOTE    Review Date & Time: 7/17/2020 1:17pm    Patient was in treatment team.  See Multidisciplinary Treatment Team sheet for participants. Admission Type:   Admission Type: Voluntary    Reason for admission:  Reason for Admission: depression      Estimated Length of Stay Update:  3 days  Estimated Discharge Date Update: 3 days    PATIENT STRENGTHS:  Patient Strengths Strengths: Positive Support  Patient Strengths and Limitations:Limitations: Tendency to isolate self  Addictive Behavior:Addictive Behavior  In the past 3 months, have you felt or has someone told you that you have a problem with:  : None  Do you have a history of Chemical Use?: No  Do you have a history of Alcohol Use?: No  Do you have a history of Street Drug Abuse?: No  Histroy of Prescripton Drug Abuse?: No  Medical Problems:  Past Medical History:   Diagnosis Date    Anxiety     Depression     PTSD (post-traumatic stress disorder)        EDUCATION:   Learner Progress Toward Treatment Goals: Reviewed results and recommendations of this team, Reviewed group plan and strategies, Reviewed signs, symptoms and risk of self harm and violent behavior and Reviewed goals and plan of care    Method: Individual    Outcome: Verbalized understanding and Demonstrated Understanding    PATIENT GOALS: to adjust her medication    PLAN/TREATMENT RECOMMENDATIONS UPDATE:   1. What is the most important thing we can help you with while you are here? See goal  2. Who is your support system? Mother, boyfriend [facility], friend Jacinta Sandy  3. Do you have follow-up providers? [place]: Dr. Laquita Stinson and counselor Claudio Henry  4. Do you have the ability to pay for your medications? ye  5. Where will you be residing when you leave the hospital? With her boyfriend [facility]  6.  Will need a return to work slip or FMLA paper completion? denies      GOALS UPDATE:   Time frame for Short-Term Goals: daily/ongoing    [facility] Francisco Rabago, [facility]

## 2020-07-17 NOTE — PLAN OF CARE
Patient has participated in one group so far today so she is working toward her socialization goal for this shift.

## 2020-07-17 NOTE — BH NOTE
Group Therapy Note    Date: 7/17/2020  Start Time: 2000  End Time:  2020  Number of Participants: 1    Type of Group: Wrap-Up    Wellness Binder Information  Module Name:    Session Number:      Patient's Goal:  journal    Notes: Working on    Status After Intervention:  Unchanged    Participation Level: Active Listener    Participation Quality: Appropriate      Speech:  normal      Thought Process/Content: Logical      Affective Functioning: Flat      Mood: depressed      Level of consciousness:  Alert      Response to Learning: Able to verbalize current knowledge/experience      Endings: Suicidality    Modes of Intervention: Education      Discipline Responsible: Registered Nurse      Signature:   July Alexis RN

## 2020-07-17 NOTE — BH NOTE
PLAN OF CARE:     Start Time: 0900  End Time:  0930    Group Topic:  Daily Goals    Group Type:   Goal Group    Intervention/Goal:  To increase support and identify daily goals    Attendance:  Participated in group    Affect:   bright    Behavior: pleasant and cooperative     Response:  appropriate    Daily Goal:  To draw today.      Progress:  Progressing to goal

## 2020-07-18 PROCEDURE — 1240000000 HC EMOTIONAL WELLNESS R&B

## 2020-07-18 PROCEDURE — 6370000000 HC RX 637 (ALT 250 FOR IP): Performed by: PSYCHIATRY & NEUROLOGY

## 2020-07-18 PROCEDURE — 99231 SBSQ HOSP IP/OBS SF/LOW 25: CPT | Performed by: NURSE PRACTITIONER

## 2020-07-18 RX ADMIN — TRAZODONE HYDROCHLORIDE 50 MG: 50 TABLET ORAL at 21:08

## 2020-07-18 RX ADMIN — FLUOXETINE HYDROCHLORIDE 20 MG: 20 CAPSULE ORAL at 10:51

## 2020-07-18 RX ADMIN — BUSPIRONE HYDROCHLORIDE 5 MG: 5 TABLET ORAL at 15:40

## 2020-07-18 RX ADMIN — BUSPIRONE HYDROCHLORIDE 5 MG: 5 TABLET ORAL at 21:08

## 2020-07-18 RX ADMIN — BUSPIRONE HYDROCHLORIDE 5 MG: 5 TABLET ORAL at 10:52

## 2020-07-18 RX ADMIN — ARIPIPRAZOLE 5 MG: 5 TABLET ORAL at 21:08

## 2020-07-18 ASSESSMENT — PAIN SCALES - GENERAL: PAINLEVEL_OUTOF10: 0

## 2020-07-18 NOTE — PLAN OF CARE
Problem: Altered Mood, Depressive Behavior:  Goal: Able to verbalize and/or display a decrease in depressive symptoms  Description: Able to verbalize and/or display a decrease in depressive symptoms  7/17/2020 2241 by Antonella Castaneda RN  Outcome: Met This Shift  Note: Patient denies depressive symptoms. 7/17/2020 1122 by Lc Puri RN  Outcome: Ongoing  Note: Describes  mood as 6/10  Goal: Ability to disclose and discuss suicidal ideas will improve  Description: Ability to disclose and discuss suicidal ideas will improve  7/17/2020 2241 by Antonella Castaneda RN  Outcome: Met This Shift  Note: Patient denies suicidal thoughts this shift. 7/17/2020 1122 by Lc Puri RN  Outcome: Met This Shift  Goal: Absence of self-harm  Description: Absence of self-harm  7/17/2020 2241 by Antonella Castaneda RN  Outcome: Met This Shift  Note: Patient remains free from self-harm. 7/17/2020 1122 by Lc Puri RN  Outcome: Met This Shift     Problem: Activity:  Goal: Sleeping patterns will improve  Description: Sleeping patterns will improve  7/17/2020 2241 by Antonella Castaneda RN  Outcome: Met This Shift  Note: Patient slept 8 hours last night per report. 7/17/2020 1122 by Lc Puri RN  Outcome: Met This Shift     Problem: Altered Mood, Depressive Behavior:  Goal: Participates in care planning  Description: Participates in care planning  7/17/2020 2241 by Antonella Castaneda RN  Outcome: Ongoing  Note: Care plan reviewed with patient. Patient does verbalize understanding of the plan of care and does not contribute to goal setting . 7/17/2020 1122 by Lc Puri RN  Outcome: Ongoing  Note: Discussed tx plan with patient. Encouraged to attend groups .  Compliant with medications      Problem: Discharge Planning:  Goal: Discharged to appropriate level of care  Description: Discharged to appropriate level of care  7/17/2020 2241 by Antonella Castaneda RN  Outcome: Ongoing  Note: Discharge planning is in process. 7/17/2020 1122 by Christopher Smith RN  Outcome: Ongoing  Note: Plans to return home with roommates. Follow up with [facility]     Problem: Coping:  Goal: Ability to identify problematic behaviors that deter socialization will improve  Description: Ability to identify problematic behaviors that deter socialization will improve  Outcome: Ongoing     Problem: Altered Mood, Depressive Behavior:  Goal: Patient specific goal  Description: Patient specific goal  7/17/2020 2241 by Rola Berumen RN  Outcome: Not Met This Shift  Note: Patient did not have a goal for today. 7/17/2020 1122 by Christopher Smith RN  Outcome: Ongoing     Problem: KNOWLEDGE DEFICIT  Goal: Knowledge - personal safety  7/17/2020 2241 by Rola Berumen RN  Outcome: Not Met This Shift  Note: Patient has not established a safety plan for discharge. 7/17/2020 1122 by Christopher Smith RN  Outcome: Ongoing     Problem: Suicide risk  Description: Suicide risk  Goal: Provide patient with safe environment  Description: Provide patient with safe environment  7/17/2020 2241 by Rola Berumen RN  Outcome: Completed  Note: Patient is on 15 minute close observation  7/17/2020 1122 by Christopher Smith RN  Outcome: Met This Shift  Note: Patient checks done every 15 minutes. Patient encouraged to call for assistance to ambulate. Environmental hazards removed. Problem: Altered Mood, Depressive Behavior:  Goal: Able to verbalize support systems  Description: Able to verbalize support systems  7/17/2020 2241 by Rola Berumen RN  Outcome: Completed  Note: Patient reports that her mom and boyfriend are supportive. 7/17/2020 1122 by Christopher Smith RN  Outcome: Ongoing  Note: Mom and boyfriend     Problem:  Activity:  Goal: Physical symptoms of sleep deprivation will improve  Description: Physical symptoms of sleep deprivation will improve  7/17/2020 2241 by Rola Berumen RN  Outcome: Completed  7/17/2020 1122 by Christopher Smith RN  Outcome: Met This Shift     Problem: General Medical Problem-Behavioral Health  Goal: Absence of urinary tract infection signs and symptoms  Description: Absence of urinary tract infection signs and symptoms  7/17/2020 2241 by Jackeline Doherty RN  Outcome: Completed  7/17/2020 1122 by Martín Gruber RN  Outcome: Ongoing  Note:  Informed of urine results and antibiotic prescribed in the emergency room.   Goal: Hemodynamic stability will improve  Description: Hemodynamic stability will improve  7/17/2020 2241 by Jackeline Doherty RN  Outcome: Completed  7/17/2020 1122 by Martín Gruber RN  Outcome: Ongoing

## 2020-07-18 NOTE — GROUP NOTE
Group Therapy Note    Date: 7/18/2020    Group Start Time: 0900  Group End Time: 0930  Group Topic: [employer]    [facility] 4E    CANDELARIO Wynn        Group Therapy Note    Attendees: 4         Patient's Goal:  To journal today. Notes:  Pt is engaging in group therapy conversation appropriately and is noted to listen appropriately to her peers throughout conversation. Pt is noted to require increased verbal prompting from staff to participate in group therapy conversation. Pt appears flat and dismissive during group therapy conversation. Status After Intervention:  Unchanged    Participation Level:  Active Listener and Minimal    Participation Quality: Appropriate and Attentive      Speech:  hesitant      Thought Process/Content: Linear      Affective Functioning: Flat      Mood: dysphoric      Level of consciousness:  Alert, Oriented x4 and Attentive      Response to Learning: Able to verbalize current knowledge/experience, Able to retain information and Progressing to goal      Endings: None Reported    Modes of Intervention: Education, Support, Socialization, Exploration, Clarifying, Activity, Limit-setting and Reality-testing      Discipline Responsible: Psychoeducational Specialist      Signature:  Yris Nicole

## 2020-07-18 NOTE — PLAN OF CARE
Problem: Altered Mood, Depressive Behavior:  Goal: Able to verbalize and/or display a decrease in depressive symptoms  Description: Able to verbalize and/or display a decrease in depressive symptoms  Outcome: Ongoing  Note: Patient reports mood 8/10 with 10 being normal. Has blunt and flat, appears depressed affect. Speech clear . Good eye contact. Reports hope for future and identifies mom and boyfriend as his support system. Problem: Altered Mood, Depressive Behavior:  Goal: Ability to disclose and discuss suicidal ideas will improve  Description: Ability to disclose and discuss suicidal ideas will improve  Outcome: Ongoing  Note: Patient denies suicidal ideations, no plan or intent to harm self. Patient remains on suicidal precautions 15 checks for safety. Instructed to seek staff as needed for thoughts of self harm. Problem: Altered Mood, Depressive Behavior:  Goal: Absence of self-harm  Description: Absence of self-harm  Outcome: Ongoing  Note: No self harm behaviors were observed or reported so far this shift. Remains on every 15 minutes precautions for safety. Problem: Altered Mood, Depressive Behavior:  Goal: Patient specific goal  Description: Patient specific goal  Outcome: Ongoing  Note: Patient reports goal for today is to \"journal\". Patient continues to work towards goal.       Problem: Altered Mood, Depressive Behavior:  Goal: Participates in care planning  Description: Participates in care planning  Outcome: Ongoing  Note: Patient discussed treatment plan with physician/medical staff, attending group, and compliant with medications. Problem: Activity:  Goal: Sleeping patterns will improve  Description: Sleeping patterns will improve  Outcome: Ongoing  Note: Patient slept 8.5 hours last night, reports she slept well. Encourage patient not to take naps during the day, relax several hours before bed and to take prescribed sleep meds as ordered.  Patient verbalized understanding. Problem: Discharge Planning:  Goal: Discharged to appropriate level of care  Description: Discharged to appropriate level of care  Outcome: Ongoing  Note: Patient voices no needs before discharge. Patient plans to be discharged to home with room mate. Discharge planner working with patient to achieve optimal discharge plans, specific to individual needs. Problem: KNOWLEDGE DEFICIT  Goal: Knowledge - personal safety  Outcome: Ongoing  Note: Maintained in safe and secure environment. Patient did not fill out safety plan this shift. Care plan reviewed with patient. Patient verbalize understanding of the plan of care and contribute to goal setting.

## 2020-07-18 NOTE — PROGRESS NOTES
Group Therapy Note    Date: 7/17/2020  Start Time: 2000  End Time:  2020      Type of Group: Relaxation      Patient's Goal:  No goal set    Notes:  Patient was out for relaxation     Status After Intervention:  Improved    Participation Level:  Active Listener    Participation Quality: Appropriate and Attentive      Speech:  normal      Thought Process/Content: Logical      Affective Functioning: Congruent      Mood: anxious and depressed      Level of consciousness:  Alert and Oriented x4      Response to Learning: Able to verbalize current knowledge/experience      Endings: None Reported    Modes of Intervention: Support and Socialization      Discipline Responsible: Registered Nurse      Signature:  Giorgio Castillo RN

## 2020-07-18 NOTE — BH NOTE
Pt declined to attend [address] [employer] group therapy session. Pt was provided maximum verbal encouragement to attend group therapy session, pt remained resting in her room during time of group therapy session.

## 2020-07-19 PROCEDURE — 6370000000 HC RX 637 (ALT 250 FOR IP): Performed by: PSYCHIATRY & NEUROLOGY

## 2020-07-19 PROCEDURE — 1240000000 HC EMOTIONAL WELLNESS R&B

## 2020-07-19 PROCEDURE — 99231 SBSQ HOSP IP/OBS SF/LOW 25: CPT | Performed by: NURSE PRACTITIONER

## 2020-07-19 RX ADMIN — FLUOXETINE HYDROCHLORIDE 20 MG: 20 CAPSULE ORAL at 08:33

## 2020-07-19 RX ADMIN — TRAZODONE HYDROCHLORIDE 50 MG: 50 TABLET ORAL at 21:26

## 2020-07-19 RX ADMIN — BUSPIRONE HYDROCHLORIDE 5 MG: 5 TABLET ORAL at 14:31

## 2020-07-19 RX ADMIN — BUSPIRONE HYDROCHLORIDE 5 MG: 5 TABLET ORAL at 21:26

## 2020-07-19 RX ADMIN — ARIPIPRAZOLE 5 MG: 5 TABLET ORAL at 21:26

## 2020-07-19 RX ADMIN — BUSPIRONE HYDROCHLORIDE 5 MG: 5 TABLET ORAL at 08:33

## 2020-07-19 ASSESSMENT — PAIN SCALES - GENERAL
PAINLEVEL_OUTOF10: 0
PAINLEVEL_OUTOF10: 0

## 2020-07-19 NOTE — PATIENT CARE CONFERENCE
[address] [place] [address]  Day 3 Interdisciplinary Treatment Plan NOTE    Review Date & Time: 7/19/2020 12:43 PM    Patient was in treatment team    Admission Type:   Admission Type: Voluntary    Reason for admission:  Reason for Admission: depression  Estimated Length of Stay Update:  3-5 days   Estimated Discharge Date Update: 3-5 days     PATIENT STRENGTHS:  Patient Strengths Strengths: Positive Support  Patient Strengths and Limitations:Limitations: Tendency to isolate self  Addictive Behavior:Addictive Behavior  In the past 3 months, have you felt or has someone told you that you have a problem with:  : None  Do you have a history of Chemical Use?: No  Do you have a history of Alcohol Use?: No  Do you have a history of Street Drug Abuse?: No  Histroy of Prescripton Drug Abuse?: No  Medical Problems:  Past Medical History:   Diagnosis Date    Anxiety     Depression     PTSD (post-traumatic stress disorder)        Risk:  Fall RiskTotal: 53  Ivan Scale Ivan Scale Score: 21  BVC Total: 0    Status EXAM:   Status and Exam  Normal: No  Facial Expression: Flat, Brightened  Affect: Stable  Level of Consciousness: Alert  Mood:Normal: Yes  Mood: Depressed  Motor Activity:Normal: Yes  Interview Behavior: Cooperative  Preception: [place] to Person, [place] to Time, [place] to Place, [place] to Situation  Attention:Normal: Yes  Thought Processes: Circumstantial  Thought Content:Normal: Yes  Hallucinations: None  Delusions: No  Memory:Normal: Yes  Insight and Judgment: No  Insight and Judgment: Poor Judgment, Poor Insight  Present Suicidal Ideation: No  Present Homicidal Ideation: No    Daily Assessment Last Entry:   Daily Sleep (WDL): Within Defined Limits         Patient Currently in Pain: Denies  Daily Nutrition (WDL): Within Defined Limits    Patient Monitoring:  Frequency of Checks: 4 times per hour, close    Psychiatric Symptoms:   Depression Symptoms  Depression Symptoms: No problems reported or observed. Anxiety Symptoms  Anxiety Symptoms: No problems reported or observed. Mary Alice Symptoms  [place] Symptoms: No problems reported or observed. Psychosis Symptoms  Delusion Type: No problems reported or observed. Suicide Risk CSSR-S:  1) Within the past month, have you wished you were dead or wished you could go to sleep and not wake up? : Yes  2) Have you actually had any thoughts of killing yourself? : Yes  3) Have you been thinking about how you might kill yourself? : Yes  5) Have you started to work out or worked out the details of how to kill yourself? Do you intend to carry out this plan? : Yes  6) Have you ever done anything, started to do anything, or prepared to do anything to end your life?: Yes        EDUCATION:   Learner Progress Toward Treatment Goals: Reviewed results and recommendations of this team, Reviewed group plan and strategies, Reviewed signs, symptoms and risk of self harm and violent behavior and Reviewed goals and plan of care    Method: Individual    Outcome: Verbalized understanding and Demonstrated Understanding    PATIENT GOALS: Medications and counseling     PLAN/TREATMENT RECOMMENDATIONS UPDATE:  1. How are you progressing toward meeting your main treatment goal? I feel that I am doing better today and hope to go home. 2.  Are there discharge barriers/lingering problems that need to be addressed?  none      3. Do you have the ability to pay for your medications? Yes       4. How is your group participation? \"I have gone to groups. \"    GOALS UPDATE:   Time frame for Short-Term Goals: ongoing      [facility], [facility]

## 2020-07-19 NOTE — PLAN OF CARE
Problem: Altered Mood, Depressive Behavior:  Goal: Able to verbalize and/or display a decrease in depressive symptoms  Description: Able to verbalize and/or display a decrease in depressive symptoms  Outcome: Ongoing  Note: Patient reports mood 8/10 with 10 being normal. Has flat affect, brightens with interaction. Speech clear. Good eye contact. Reports hope for future and identifies mom and boyfriend as her support system. Problem: Altered Mood, Depressive Behavior:  Goal: Ability to disclose and discuss suicidal ideas will improve  Description: Ability to disclose and discuss suicidal ideas will improve  Outcome: Ongoing  Note: Patient denies suicidal ideations, no plan or intent to harm self. Patient remains on suicidal precautions 15 checks for safety. Instructed to seek staff as needed for thoughts of self harm. Problem: Altered Mood, Depressive Behavior:  Goal: Absence of self-harm  Description: Absence of self-harm  Outcome: Ongoing  Note: No self harm behaviors were observed or reported so far this shift. Remains on every 15 minutes precautions for safety. Problem: Altered Mood, Depressive Behavior:  Goal: Patient specific goal  Description: Patient specific goal  Outcome: Ongoing  Note: Patient reports goal for today is to journal. Patient continues to work towards goal.       Problem: Altered Mood, Depressive Behavior:  Goal: Participates in care planning  Description: Participates in care planning  Outcome: Ongoing  Note: Patient discussed treatment plan with physician/medical staff, attending group, and compliant with medications. Problem: Activity:  Goal: Sleeping patterns will improve  Description: Sleeping patterns will improve  Outcome: Ongoing  Note: Patient slept 8 hours last night, reports she slept well. Encourage patient not to take naps during the day, relax several hours before bed and to take prescribed sleep meds as ordered. Patient verbalized understanding.

## 2020-07-19 NOTE — PLAN OF CARE
Problem: Coping:  Goal: Ability to identify problematic behaviors that deter socialization will improve  Description: Ability to identify problematic behaviors that deter socialization will improve  Outcome: Ongoing  Note: Pt has attended 1/3 groups that have been offered on the unit. Pt has been seen out on the unit interacting with peers. Pt will be encouraged to attend groups and interact with peers.   Pt progress is ongoing towards socialization goal.

## 2020-07-19 NOTE — PLAN OF CARE
Problem: Altered Mood, Depressive Behavior:  Goal: Ability to disclose and discuss suicidal ideas will improve  Description: Ability to disclose and discuss suicidal ideas will improve  7/18/2020 2210 by James Maria RN  Outcome: Met This Shift  Note: Patient denies suicidal thoughts this shift. 7/18/2020 1553 by Maribel Garibay RN  Outcome: Ongoing  Note: Patient denies suicidal ideations, no plan or intent to harm self. Patient remains on suicidal precautions 15 checks for safety. Instructed to seek staff as needed for thoughts of self harm. Goal: Absence of self-harm  Description: Absence of self-harm  7/18/2020 2210 by James Maria RN  Outcome: Met This Shift  Note: Patient remains free from self-harm this shift. 7/18/2020 1553 by aMribel Garibay RN  Outcome: Ongoing  Note: No self harm behaviors were observed or reported so far this shift. Remains on every 15 minutes precautions for safety. Goal: Patient specific goal  Description: Patient specific goal  7/18/2020 2210 by James Maria RN  Outcome: Met This Shift  Note: Patient reports that she met her goal for today to work on her journal.  7/18/2020 1553 by Maribel Garibay RN  Outcome: Ongoing  Note: Patient reports goal for today is to \"journal\". Patient continues to work towards goal.    Goal: Participates in care planning  Description: Participates in care planning  7/18/2020 2210 by James Maria RN  Outcome: Met This Shift  Note: Care plan reviewed with patient. Patient does verbalize understanding of the plan of care and does contribute to goal setting . 7/18/2020 1553 by Maribel Garibay RN  Outcome: Ongoing  Note: Patient discussed treatment plan with physician/medical staff, attending group, and compliant with medications. Problem:  Activity:  Goal: Sleeping patterns will improve  Description: Sleeping patterns will improve  7/18/2020 2210 by James Maria RN  Outcome: Met This Shift  7/18/2020 1553 by Valentin Stafford RN  Outcome: Ongoing  Note: Patient slept 8.5 hours last night, reports she slept well. Encourage patient not to take naps during the day, relax several hours before bed and to take prescribed sleep meds as ordered. Patient verbalized understanding. Problem: Altered Mood, Depressive Behavior:  Goal: Able to verbalize and/or display a decrease in depressive symptoms  Description: Able to verbalize and/or display a decrease in depressive symptoms  7/18/2020 2210 by Bozena Headley RN  Outcome: Ongoing  Note: Patient reports that she feels a \"little\" depressed today. Patient reports she has a loss of interest.  7/18/2020 1553 by Valentin Stafford RN  Outcome: Ongoing  Note: Patient reports mood 8/10 with 10 being normal. Has blunt and flat, appears depressed affect. Speech clear . Good eye contact. Reports hope for future and identifies mom and boyfriend as his support system. Problem: Discharge Planning:  Goal: Discharged to appropriate level of care  Description: Discharged to appropriate level of care  7/18/2020 2210 by Bozena Headley RN  Outcome: Ongoing  Note: Discharge planning is in process. 7/18/2020 1553 by Valentin Stafford RN  Outcome: Ongoing  Note: Patient voices no needs before discharge. Patient plans to be discharged to home with room mate. Discharge planner working with patient to achieve optimal discharge plans, specific to individual needs. Problem: KNOWLEDGE DEFICIT  Goal: Knowledge - personal safety  7/18/2020 2210 by Bozena Headley RN  Outcome: Ongoing  Note: Patient is working toward establishing a safety plan for discharge. 7/18/2020 1553 by Valentin Stafford RN  Outcome: Ongoing  Note: Maintained in safe and secure environment. Patient did not fill out safety plan this shift.        Problem: Coping:  Goal: Ability to identify problematic behaviors that deter socialization will improve  Description: Ability to identify problematic behaviors that deter socialization will improve  7/18/2020 2210 by Hui Flores RN  Outcome: Ongoing  7/18/2020 1553 by Bonnie Ruth RN  Outcome: Ongoing

## 2020-07-19 NOTE — GROUP NOTE
Group Therapy Note    Date: 7/19/2020    Group Start Time: 1000  Group End Time: 3694  Group Topic: [address] Adult Psych 4E    Heber Whyte, CANDELARIO    Group Therapy Note    Attendees: 7         Pt did not attend 1000 goal group and community meeting. Pt received maximum encouragement to attend group. Pt remained sitting in his room. No goal obtained.     Signature:  Pau Blake

## 2020-07-19 NOTE — PROGRESS NOTES
Group Therapy Note    Date: 7/18/2020  Start Time: 2000  End Time:  2020      Type of Group: Wrap-Up       Patient's Goal:  Patient's goal was to journal.     Notes:  Met    Status After Intervention:  Improved    Participation Level:  Active Listener    Participation Quality: Appropriate and Attentive      Speech:  normal      Thought Process/Content: Logical      Affective Functioning: Congruent      Mood: calm and plaeassant      Level of consciousness:  Alert and Oriented x4      Response to Learning: Able to verbalize current knowledge/experience      Endings: None Reported    Modes of Intervention: Support and Socialization      Discipline Responsible: Registered Nurse      Signature:  Wisam Elizondo RN

## 2020-07-20 VITALS
RESPIRATION RATE: 16 BRPM | HEIGHT: 61 IN | DIASTOLIC BLOOD PRESSURE: 66 MMHG | TEMPERATURE: 97.2 F | OXYGEN SATURATION: 97 % | SYSTOLIC BLOOD PRESSURE: 116 MMHG | HEART RATE: 72 BPM | WEIGHT: 156 LBS | BODY MASS INDEX: 29.45 KG/M2

## 2020-07-20 PROCEDURE — 99239 HOSP IP/OBS DSCHRG MGMT >30: CPT | Performed by: PSYCHIATRY & NEUROLOGY

## 2020-07-20 PROCEDURE — 6370000000 HC RX 637 (ALT 250 FOR IP): Performed by: PSYCHIATRY & NEUROLOGY

## 2020-07-20 RX ORDER — ARIPIPRAZOLE 5 MG/1
5 TABLET ORAL NIGHTLY
Qty: 30 TABLET | Refills: 0 | Status: ON HOLD | OUTPATIENT
Start: 2020-07-20 | End: 2020-11-18 | Stop reason: SDUPTHER

## 2020-07-20 RX ORDER — BUSPIRONE HYDROCHLORIDE 5 MG/1
5 TABLET ORAL 3 TIMES DAILY
Qty: 45 TABLET | Refills: 0 | Status: SHIPPED | OUTPATIENT
Start: 2020-07-20 | End: 2020-08-04

## 2020-07-20 RX ORDER — TRAZODONE HYDROCHLORIDE 50 MG/1
50 TABLET ORAL NIGHTLY PRN
Qty: 30 TABLET | Refills: 0 | Status: ON HOLD | OUTPATIENT
Start: 2020-07-20 | End: 2020-11-14

## 2020-07-20 RX ORDER — HYDROXYZINE 50 MG/1
50 TABLET, FILM COATED ORAL 3 TIMES DAILY PRN
Qty: 45 TABLET | Refills: 0 | Status: SHIPPED | OUTPATIENT
Start: 2020-07-20 | End: 2020-08-04

## 2020-07-20 RX ADMIN — FLUOXETINE HYDROCHLORIDE 20 MG: 20 CAPSULE ORAL at 08:01

## 2020-07-20 RX ADMIN — BUSPIRONE HYDROCHLORIDE 5 MG: 5 TABLET ORAL at 08:01
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
## 2020-07-20 NOTE — PLAN OF CARE
Problem: Altered Mood, Depressive Behavior:  Goal: Able to verbalize and/or display a decrease in depressive symptoms  Description: Able to verbalize and/or display a decrease in depressive symptoms  Outcome: Completed  Goal: Ability to disclose and discuss suicidal ideas will improve  Description: Ability to disclose and discuss suicidal ideas will improve  Outcome: Completed  Goal: Absence of self-harm  Description: Absence of self-harm  Outcome: Completed  Goal: Patient specific goal  Description: Patient specific goal  Outcome: Completed  Goal: Participates in care planning  Description: Participates in care planning  Outcome: Completed     Problem:  Activity:  Goal: Sleeping patterns will improve  Description: Sleeping patterns will improve  Outcome: Completed     Problem: Discharge Planning:  Goal: Discharged to appropriate level of care  Description: Discharged to appropriate level of care  Outcome: Completed     Problem: KNOWLEDGE DEFICIT  Goal: Knowledge - personal safety  Outcome: Completed     Problem: Coping:  Goal: Ability to identify problematic behaviors that deter socialization will improve  Description: Ability to identify problematic behaviors that deter socialization will improve  7/20/2020 1103 by Subhash Anderson  Outcome: Completed

## 2020-07-20 NOTE — PLAN OF CARE
Problem: Altered Mood, Depressive Behavior:  Goal: Able to verbalize and/or display a decrease in depressive symptoms  Description: Able to verbalize and/or display a decrease in depressive symptoms  7/19/2020 2051 by May Yao RN  Outcome: Met This Shift  Note: Denies depression rates her mood a 9 out of 10 with 10 being the best. Affect bright. Good eye contact. States she is hopeful for the future. Isolated to room this shift only peer interaction was with her roommate this shift. 7/19/2020 1410 by Natalia Hill RN  Outcome: Ongoing  Note: Patient reports mood 8/10 with 10 being normal. Has flat affect, brightens with interaction. Speech clear. Good eye contact. Reports hope for future and identifies mom and boyfriend as her support system. Goal: Ability to disclose and discuss suicidal ideas will improve  Description: Ability to disclose and discuss suicidal ideas will improve  7/19/2020 2051 by May Yao RN  Outcome: Met This Shift  Note: Denies suicidal ideations. 7/19/2020 1410 by Natalia Hill RN  Outcome: Ongoing  Note: Patient denies suicidal ideations, no plan or intent to harm self. Patient remains on suicidal precautions 15 checks for safety. Instructed to seek staff as needed for thoughts of self harm. Goal: Absence of self-harm  Description: Absence of self-harm  7/19/2020 2051 by May Yao RN  Outcome: Met This Shift  Note: Pt remains safe and free from harm. 15 minute safety checks are being completed throughout shift. 7/19/2020 1410 by Natalia Hill RN  Outcome: Ongoing  Note: No self harm behaviors were observed or reported so far this shift. Remains on every 15 minutes precautions for safety. Goal: Patient specific goal  Description: Patient specific goal  7/19/2020 2051 by May Yao RN  Outcome: Met This Shift  Note:  To finish book   7/19/2020 1410 by Natalia Hill RN  Outcome: Ongoing  Note: Patient reports goal for today is to journal. Patient continues to work towards goal.    Goal: Participates in care planning  Description: Participates in care planning  7/19/2020 2051 by Mateo Bates RN  Outcome: Met This Shift  Note: Care plan reviewed with patient and verbalize understanding of the plan of care and contribute to goal setting. 7/19/2020 1410 by Martínez Jackson RN  Outcome: Ongoing  Note: Patient discussed treatment plan with physician/medical staff, attending group, and compliant with medications. Problem: Activity:  Goal: Sleeping patterns will improve  Description: Sleeping patterns will improve  7/19/2020 2051 by Mateo Bates RN  Outcome: Met This Shift  Note: Slept 8 hours last evening states slept well. 7/19/2020 1410 by Martínez Jackson RN  Outcome: Ongoing  Note: Patient slept 8 hours last night, reports she slept well. Encourage patient not to take naps during the day, relax several hours before bed and to take prescribed sleep meds as ordered. Patient verbalized understanding. Problem: Discharge Planning:  Goal: Discharged to appropriate level of care  Description: Discharged to appropriate level of care  7/19/2020 2051 by Mateo Bates RN  Outcome: Ongoing  Note: Discharge planning in progress. Plans to return back to live with boyfriend and follow up with counselor. 7/19/2020 1410 by Martínez Jackson RN  Outcome: Ongoing  Note: Patient voices no needs before discharge. Patient plans to be discharged to home with boyfriend. Discharge planner working with patient to achieve optimal discharge plans, specific to individual needs. Problem: Coping:  Goal: Ability to identify problematic behaviors that deter socialization will improve  Description: Ability to identify problematic behaviors that deter socialization will improve  7/19/2020 1533 by Azul Shelton  Outcome: Ongoing  Note: Pt has attended 1/3 groups that have been offered on the unit.   Pt has been seen out on the unit interacting with peers. Pt will be encouraged to attend groups and interact with peers. Pt progress is ongoing towards socialization goal.  7/19/2020 1410 by Ana Baker RN  Outcome: Ongoing     Problem: KNOWLEDGE DEFICIT  Goal: Knowledge - personal safety  7/19/2020 2051 by Nick Vicente RN  Outcome: Not Met This Shift  Note: Encouraged to complete before discharge. 7/19/2020 1410 by Ana Baker RN  Outcome: Ongoing  Note: Maintained in safe and secure environment. Patient did not fill out safety plan this shift.

## 2020-07-20 NOTE — GROUP NOTE
Group Therapy Note    Date: 7/20/2020    Group Start Time: 0930  Group End Time: 1000  Group Topic: [facility]. Adult Psych 4E    [place], [place]    Group Therapy Note    Attendees: 9         Pt did not attend 0930 goal group and community meeting. Pt received maximum encouragement for group attendance by staff. Pt did not participated in 1:1 goal identification session.     Signature:  Caden Hall

## 2020-07-20 NOTE — PROGRESS NOTES
Group Therapy Note    Date: 7/19/2020  Start Time: 2000  End Time:  2020  Number of Participants:     Type of Group: Wrap-Up    Wellness Binder Information  Module Name:    Session Number:      Patient's Goal:  To finish book     Notes:  Goal met     Status After Intervention:  Unchanged    Participation Level:  Active Listener and Interactive    Participation Quality: Appropriate      Speech:  normal      Thought Process/Content: Logical      Affective Functioning: Congruent      Mood: euthymic      Level of consciousness:  Alert      Response to Learning: Able to verbalize current knowledge/experience, Able to verbalize/acknowledge new learning and Able to retain information      Endings: None Reported    Modes of Intervention: Support and Socialization      Discipline Responsible: Registered Nurse      Signature:  Cyrus Forrest, RN

## 2020-07-21 ENCOUNTER — TELEPHONE (OUTPATIENT)
Dept: PSYCHIATRY | Age: 20
End: 2020-07-21

## 2020-11-13 ENCOUNTER — HOSPITAL ENCOUNTER (INPATIENT)
Age: 20
LOS: 4 days | Discharge: HOME | DRG: 751 | End: 2020-11-18
Attending: PSYCHIATRY & NEUROLOGY | Admitting: PSYCHIATRY & NEUROLOGY
Payer: MEDICARE

## 2020-11-13 LAB
ACETAMINOPHEN LEVEL: < 5 UG/ML (ref 0–20)
ALBUMIN SERPL-MCNC: 4.2 G/DL (ref 3.5–5.1)
ALP BLD-CCNC: 82 U/L (ref 38–126)
ALT SERPL-CCNC: 35 U/L (ref 11–66)
AMPHETAMINE+METHAMPHETAMINE URINE SCREEN: NEGATIVE
ANION GAP SERPL CALCULATED.3IONS-SCNC: 12 MEQ/L (ref 8–16)
AST SERPL-CCNC: 20 U/L (ref 5–40)
BACTERIA: ABNORMAL /HPF
BARBITURATE QUANTITATIVE URINE: NEGATIVE
BASOPHILS # BLD: 0.4 %
BASOPHILS ABSOLUTE: 0 THOU/MM3 (ref 0–0.1)
BENZODIAZEPINE QUANTITATIVE URINE: NEGATIVE
BILIRUB SERPL-MCNC: 0.2 MG/DL (ref 0.3–1.2)
BILIRUBIN URINE: NEGATIVE
BLOOD, URINE: NEGATIVE
BUN BLDV-MCNC: 11 MG/DL (ref 7–22)
CALCIUM SERPL-MCNC: 9.2 MG/DL (ref 8.5–10.5)
CANNABINOID QUANTITATIVE URINE: NEGATIVE
CASTS 2: ABNORMAL /LPF
CASTS UA: ABNORMAL /LPF
CHARACTER, URINE: ABNORMAL
CHLORIDE BLD-SCNC: 104 MEQ/L (ref 98–111)
CO2: 26 MEQ/L (ref 23–33)
COCAINE METABOLITE QUANTITATIVE URINE: NEGATIVE
COLOR: YELLOW
CREAT SERPL-MCNC: 0.6 MG/DL (ref 0.4–1.2)
CRYSTALS, UA: ABNORMAL
EOSINOPHIL # BLD: 1.1 %
EOSINOPHILS ABSOLUTE: 0.1 THOU/MM3 (ref 0–0.4)
EPITHELIAL CELLS, UA: ABNORMAL /HPF
ERYTHROCYTE [DISTWIDTH] IN BLOOD BY AUTOMATED COUNT: 12.8 % (ref 11.5–14.5)
ERYTHROCYTE [DISTWIDTH] IN BLOOD BY AUTOMATED COUNT: 40.4 FL (ref 35–45)
ETHYL ALCOHOL, SERUM: < 0.01 %
GFR SERPL CREATININE-BSD FRML MDRD: > 90 ML/MIN/1.73M2
GLUCOSE BLD-MCNC: 108 MG/DL (ref 70–108)
GLUCOSE URINE: NEGATIVE MG/DL
HCT VFR BLD CALC: 41.7 % (ref 37–47)
HEMOGLOBIN: 13 GM/DL (ref 12–16)
IMMATURE GRANS (ABS): 0.07 THOU/MM3 (ref 0–0.07)
IMMATURE GRANULOCYTES: 0.6 %
KETONES, URINE: NEGATIVE
LEUKOCYTE ESTERASE, URINE: ABNORMAL
LYMPHOCYTES # BLD: 21.4 %
LYMPHOCYTES ABSOLUTE: 2.6 THOU/MM3 (ref 1–4.8)
MCH RBC QN AUTO: 27 PG (ref 26–33)
MCHC RBC AUTO-ENTMCNC: 31.2 GM/DL (ref 32.2–35.5)
MCV RBC AUTO: 86.7 FL (ref 81–99)
MISCELLANEOUS 2: ABNORMAL
MONOCYTES # BLD: 5.1 %
MONOCYTES ABSOLUTE: 0.6 THOU/MM3 (ref 0.4–1.3)
NITRITE, URINE: NEGATIVE
NUCLEATED RED BLOOD CELLS: 0 /100 WBC
OPIATES, URINE: NEGATIVE
OSMOLALITY CALCULATION: 283 MOSMOL/KG (ref 275–300)
OXYCODONE: NEGATIVE
PH UA: 8.5 (ref 5–9)
PHENCYCLIDINE QUANTITATIVE URINE: NEGATIVE
PLATELET # BLD: 378 THOU/MM3 (ref 130–400)
PMV BLD AUTO: 10.3 FL (ref 9.4–12.4)
POTASSIUM REFLEX MAGNESIUM: 4.2 MEQ/L (ref 3.5–5.2)
PREGNANCY, SERUM: NEGATIVE
PROTEIN UA: NEGATIVE
RBC # BLD: 4.81 MILL/MM3 (ref 4.2–5.4)
RBC URINE: ABNORMAL /HPF
RENAL EPITHELIAL, UA: ABNORMAL
SALICYLATE, SERUM: < 0.3 MG/DL (ref 2–10)
SEG NEUTROPHILS: 71.4 %
SEGMENTED NEUTROPHILS ABSOLUTE COUNT: 8.8 THOU/MM3 (ref 1.8–7.7)
SODIUM BLD-SCNC: 142 MEQ/L (ref 135–145)
SPECIFIC GRAVITY, URINE: 1.02 (ref 1–1.03)
TOTAL PROTEIN: 7.4 G/DL (ref 6.1–8)
TSH SERPL DL<=0.05 MIU/L-ACNC: 1.44 UIU/ML (ref 0.4–4.2)
UROBILINOGEN, URINE: 1 EU/DL (ref 0–1)
WBC # BLD: 12.3 THOU/MM3 (ref 4.8–10.8)
WBC UA: ABNORMAL /HPF
YEAST: ABNORMAL

## 2020-11-13 PROCEDURE — 84703 CHORIONIC GONADOTROPIN ASSAY: CPT

## 2020-11-13 PROCEDURE — 84443 ASSAY THYROID STIM HORMONE: CPT

## 2020-11-13 PROCEDURE — G0480 DRUG TEST DEF 1-7 CLASSES: HCPCS

## 2020-11-13 PROCEDURE — 99285 EMERGENCY DEPT VISIT HI MDM: CPT

## 2020-11-13 PROCEDURE — 36415 COLL VENOUS BLD VENIPUNCTURE: CPT

## 2020-11-13 PROCEDURE — 81001 URINALYSIS AUTO W/SCOPE: CPT

## 2020-11-13 PROCEDURE — 80307 DRUG TEST PRSMV CHEM ANLYZR: CPT

## 2020-11-13 PROCEDURE — 80053 COMPREHEN METABOLIC PANEL: CPT

## 2020-11-13 PROCEDURE — 85025 COMPLETE CBC W/AUTO DIFF WBC: CPT

## 2020-11-13 ASSESSMENT — ENCOUNTER SYMPTOMS
ABDOMINAL PAIN: 0
VOMITING: 0
DIARRHEA: 0
COLOR CHANGE: 0
COUGH: 0
SHORTNESS OF BREATH: 0
SORE THROAT: 0

## 2020-11-14 PROBLEM — F33.9 DEPRESSION, MAJOR, RECURRENT (HCC): Status: ACTIVE | Noted: 2020-11-14

## 2020-11-14 PROCEDURE — 99223 1ST HOSP IP/OBS HIGH 75: CPT | Performed by: PSYCHIATRY & NEUROLOGY

## 2020-11-14 PROCEDURE — 1240000000 HC EMOTIONAL WELLNESS R&B

## 2020-11-14 PROCEDURE — APPSS60 APP SPLIT SHARED TIME 46-60 MINUTES: Performed by: NURSE PRACTITIONER

## 2020-11-14 PROCEDURE — 6370000000 HC RX 637 (ALT 250 FOR IP): Performed by: PSYCHIATRY & NEUROLOGY

## 2020-11-14 PROCEDURE — 6370000000 HC RX 637 (ALT 250 FOR IP): Performed by: NURSE PRACTITIONER

## 2020-11-14 RX ORDER — FLUOXETINE HYDROCHLORIDE 20 MG/1
20 CAPSULE ORAL DAILY
Status: DISCONTINUED | OUTPATIENT
Start: 2020-11-14 | End: 2020-11-18 | Stop reason: HOSPADM

## 2020-11-14 RX ORDER — NICOTINE 21 MG/24HR
1 PATCH, TRANSDERMAL 24 HOURS TRANSDERMAL DAILY
Status: DISCONTINUED | OUTPATIENT
Start: 2020-11-14 | End: 2020-11-14

## 2020-11-14 RX ORDER — ARIPIPRAZOLE 5 MG/1
5 TABLET ORAL NIGHTLY
Status: DISCONTINUED | OUTPATIENT
Start: 2020-11-14 | End: 2020-11-18 | Stop reason: HOSPADM

## 2020-11-14 RX ORDER — MAGNESIUM HYDROXIDE/ALUMINUM HYDROXICE/SIMETHICONE 120; 1200; 1200 MG/30ML; MG/30ML; MG/30ML
30 SUSPENSION ORAL EVERY 6 HOURS PRN
Status: DISCONTINUED | OUTPATIENT
Start: 2020-11-14 | End: 2020-11-18 | Stop reason: HOSPADM

## 2020-11-14 RX ORDER — HYDROXYZINE HYDROCHLORIDE 25 MG/1
50 TABLET, FILM COATED ORAL 3 TIMES DAILY PRN
Status: DISCONTINUED | OUTPATIENT
Start: 2020-11-14 | End: 2020-11-18 | Stop reason: HOSPADM

## 2020-11-14 RX ORDER — IBUPROFEN 400 MG/1
400 TABLET ORAL EVERY 6 HOURS PRN
Status: DISCONTINUED | OUTPATIENT
Start: 2020-11-14 | End: 2020-11-18 | Stop reason: HOSPADM

## 2020-11-14 RX ORDER — BUSPIRONE HYDROCHLORIDE 5 MG/1
5 TABLET ORAL 2 TIMES DAILY
Status: ON HOLD | COMMUNITY
End: 2020-11-18 | Stop reason: HOSPADM

## 2020-11-14 RX ORDER — BUSPIRONE HYDROCHLORIDE 5 MG/1
5 TABLET ORAL 3 TIMES DAILY
Status: DISCONTINUED | OUTPATIENT
Start: 2020-11-14 | End: 2020-11-18 | Stop reason: HOSPADM

## 2020-11-14 RX ORDER — ACETAMINOPHEN 325 MG/1
650 TABLET ORAL EVERY 4 HOURS PRN
Status: DISCONTINUED | OUTPATIENT
Start: 2020-11-14 | End: 2020-11-18 | Stop reason: HOSPADM

## 2020-11-14 RX ORDER — TRAZODONE HYDROCHLORIDE 50 MG/1
50 TABLET ORAL NIGHTLY PRN
Status: DISCONTINUED | OUTPATIENT
Start: 2020-11-14 | End: 2020-11-18 | Stop reason: HOSPADM

## 2020-11-14 RX ADMIN — TRAZODONE HYDROCHLORIDE 50 MG: 50 TABLET ORAL at 20:38

## 2020-11-14 RX ADMIN — BUSPIRONE HYDROCHLORIDE 5 MG: 5 TABLET ORAL at 15:27

## 2020-11-14 RX ADMIN — BUSPIRONE HYDROCHLORIDE 5 MG: 5 TABLET ORAL at 08:52

## 2020-11-14 RX ADMIN — ARIPIPRAZOLE 5 MG: 5 TABLET ORAL at 20:38

## 2020-11-14 RX ADMIN — FLUOXETINE HYDROCHLORIDE 20 MG: 20 CAPSULE ORAL at 08:52

## 2020-11-14 RX ADMIN — BUSPIRONE HYDROCHLORIDE 5 MG: 5 TABLET ORAL at 20:38

## 2020-11-14 ASSESSMENT — SLEEP AND FATIGUE QUESTIONNAIRES
DO YOU USE A SLEEP AID: NO
AVERAGE NUMBER OF SLEEP HOURS: 7
AVERAGE NUMBER OF SLEEP HOURS: 7
DO YOU HAVE DIFFICULTY SLEEPING: NO
DO YOU HAVE DIFFICULTY SLEEPING: NO
DO YOU USE A SLEEP AID: NO

## 2020-11-14 ASSESSMENT — PAIN SCALES - GENERAL
PAINLEVEL_OUTOF10: 0

## 2020-11-14 ASSESSMENT — PATIENT HEALTH QUESTIONNAIRE - PHQ9
SUM OF ALL RESPONSES TO PHQ QUESTIONS 1-9: 9
SUM OF ALL RESPONSES TO PHQ QUESTIONS 1-9: 10

## 2020-11-14 ASSESSMENT — LIFESTYLE VARIABLES
HISTORY_ALCOHOL_USE: NO
HISTORY_ALCOHOL_USE: NO

## 2020-11-14 NOTE — ED NOTES
Pt in bed side rail up x1. Denies needs at this time. [place] police monitoring, will continue to monitor.       Warren Mckinley  11/13/20 8663

## 2020-11-14 NOTE — ED PROVIDER NOTES
[facility]  eMERGENCY dEPARTMENT eNCOUnter          CHIEF COMPLAINT       Chief Complaint   Patient presents with    Suicidal       Nurses Notes reviewed and I agree except as noted inthe HPI. HISTORY OF PRESENT ILLNESS    Rocky Short is a 21 y.o. female who presents to the Emergency Department for the evaluation of suicidal ideation. Patient arrives under [facility] placed by the crisis stabilization unit after she presented there today for complaints of suicidal ideation which she reports have been ongoing for the past month. She states she has been collecting pills and razors but just did not feel she was ready to carry out her plan yet. She has been off of her medications for the past month secondary to a missed appointment at [facility]. She was in a verbal altercation with her boyfriend today regarding her housing situation this caused her increased stress so she went to [facility] for evaluation. She denies any homicidal ideation, hallucinations, alcohol or drug use. She denies any physical concerns or complaints at this time. The HPI was provided by the patient. REVIEW OF SYSTEMS     Review of Systems   Constitutional: Negative for chills and fever. HENT: Negative for sore throat. Respiratory: Negative for cough and shortness of breath. Cardiovascular: Negative for chest pain. Gastrointestinal: Negative for abdominal pain, diarrhea and vomiting. Genitourinary: Negative for dysuria. Skin: Negative for color change. Neurological: Negative for syncope and headaches. PAST MEDICAL HISTORY    has a past medical history of Anxiety, Depression, and PTSD (post-traumatic stress disorder). SURGICAL HISTORY      has no past surgical history on file.     CURRENT MEDICATIONS       Previous Medications    ARIPIPRAZOLE (ABILIFY) 5 MG TABLET    Take 1 tablet by mouth nightly    FLUOXETINE (PROZAC) 20 MG CAPSULE    Take 20 mg by mouth daily    TRAZODONE (DESYREL) 50 MG TABLET    Take 1 tablet by mouth nightly as needed for Sleep       ALLERGIES     is allergic to dill oil. FAMILY HISTORY     She indicated that her mother is alive. She indicated that her father is alive. She indicated that her sister is alive. She indicated that her brother is alive. family history includes Anxiety Disorder in her mother; Depression in her brother; Diabetes in her father. SOCIAL HISTORY      reports that she has never smoked. She has never used smokeless tobacco. She reports that she does not drink alcohol or use drugs. PHYSICAL EXAM     INITIAL VITALS:  height is 5' 1\" (1.549 m) and weight is 167 lb (75.8 kg). Her oral temperature is 98.9 °F (37.2 °C). Her blood pressure is 129/81 and her pulse is 111. Her respiration is 20 and oxygen saturation is 98%. Physical Exam  Vitals signs and nursing note reviewed. Constitutional:       Appearance: Normal appearance. HENT:      Head: Normocephalic and atraumatic. Eyes:      Conjunctiva/sclera: Conjunctivae normal.   Neck:      Musculoskeletal: Normal range of motion. Cardiovascular:      Rate and Rhythm: Normal rate. Pulmonary:      Effort: Pulmonary effort is normal. No respiratory distress. Skin:     General: Skin is warm. Neurological:      General: No focal deficit present. Mental Status: She is alert and oriented to person, place, and time. Psychiatric:         Thought Content: Thought content includes suicidal ideation. Thought content does not include homicidal ideation. Thought content includes suicidal plan.          DIFFERENTIAL DIAGNOSIS:   Differential diagnoses are discussed    DIAGNOSTIC RESULTS     EKG: All EKG's are interpreted by the Emergency Department Physician who either signs or Co-signsthis chart in the absence of a cardiologist.          RADIOLOGY: non-plain film images(s) such as CT, Ultrasound and MRI are read by the radiologist.    No orders to display       LABS:      Labs Reviewed   CBC WITH AUTO DIFFERENTIAL - Abnormal; Notable for the following components:       Result Value    WBC 12.3 (*)     MCHC 31.2 (*)     Segs Absolute 8.8 (*)     All other components within normal limits   COMPREHENSIVE METABOLIC PANEL W/ REFLEX TO MG FOR LOW K - Abnormal; Notable for the following components: Total Bilirubin 0.2 (*)     All other components within normal limits   SALICYLATE LEVEL - Abnormal; Notable for the following components:    Salicylate, Serum < 0.3 (*)     All other components within normal limits   URINE WITH REFLEXED MICRO - Abnormal; Notable for the following components:    Leukocyte Esterase, Urine TRACE (*)     Character, Urine CLOUDY (*)     All other components within normal limits   TSH WITH REFLEX   ETHANOL   ACETAMINOPHEN LEVEL   URINE DRUG SCREEN   HCG, SERUM, QUALITATIVE   ANION GAP   GLOMERULAR FILTRATION RATE, ESTIMATED   OSMOLALITY       EMERGENCY DEPARTMENT COURSE:   Vitals:    Vitals:    11/13/20 1939   BP: 129/81   Pulse: 111   Resp: 20   Temp: 98.9 °F (37.2 °C)   TempSrc: Oral   SpO2: 98%   Weight: 167 lb (75.8 kg)   Height: 5' 1\" (1.549 m)      8:39 PM EST: The patient was seen and evaluated. Patient presents for suicidal ideation. She arrives under [facility] placed by crisis stabilization unit. She denies any physical concerns or complaints. She is mildly tachycardic but anxious on arrival.  Laboratory results were reviewed and she is medically stable at this time. She is evaluated by behavioral access center and psychiatry recommendation was for inpatient psychiatric treatment at our facility. She remained stable throughout the duration of her ED stay. CRITICAL CARE:   None    CONSULTS:  MIGUEL    PROCEDURES:  None    FINAL IMPRESSION      1. Depression with suicidal ideation          DISPOSITION/PLAN   Admit    PATIENT REFERRED TO:  No follow-up provider specified.     DISCHARGEMEDICATIONS:  New Prescriptions    No medications on file       (Please note that portions of this note were completedwith a voice recognition program.  Efforts were made to edit the dictations but occasionally words are mis-transcribed.)        Cora Mensah PA-C  11/13/20 5002

## 2020-11-14 NOTE — PROGRESS NOTES
Provisional Diagnosis:   Major Depressive Disorder     Risk, Psychosocial and Contextual Factors:  Relationship Stress, Medication Non-Compliance     Current  Treatment:  [facility] - Dr. Asher Pierre      Present Suicidal Behavior:  Yes     Verbal: Yes         Attempt: None    Access to Weapons:  None    Current Suicide Risk: Low, Moderate or High:  High     Past Suicidal Behavior: Yes     Verbal: None    Attempt: None    Self-Injurious/Self-Mutilation: Yes - cutting - last time was a couple weeks ago. Traumatic Event Within Past 2 Weeks:  Relationship Issues     Current Abuse: None    Legal: None    Violence: None    Protective Factors:  Positive Support, Outpatient Provider     Housing:    Living with ex-boyfriend     CPAP/Oxygen/Ambulation Difficulties: None    Basic Vital Signs Normal?: Check with Patients Nurse prior to Calling Psychiatry    Critical Labs?: Check with Patients Nurse prior to Calling Psychiatry    Clinical Summary:      Patient is a 21year old female who presents to the ED on [address] [facility] from [employer] [facility] reporting suicidal ideation with plan. Per [facility]: \"FRANCISCA Marcus assessed tonight for for suicidal ideations with a plan and intent. Client stated to both Dr. Asher Pierre and Demetrius Palomino that she's been collecting pills and razors for when she's \"ready\". Client has a history of cutting and was psychiatrically hospitalized in September, client has not taken her medication in a month. Client would benefit from inpatient treatment. \" Patient admits to having increased suicidal thoughts for the past month, reports she missed her appointment with her psychiatrist and she was unable to receive her medications. Patient reports that she has been having increased relationship issues as she is currently residing with her ex-boyfriend, they are still sleeping in the same room, this has created issues with her current boyfriend.  Patient denies current suicidal ideation, reports she was just feeling overwhelmed with her current situation. Patient does admit to having increased depression for the past month due to not having her psychotropics. Homicidal thoughts and/or plans denied. No delusions noted. Hallucinations denied. AOD denied. Level of Care Disposition:      - 21:19 - Consulted with medical provider Felicia Macario. Patient is medically. - 21:19 - Consulted with patients RN about abnormalities or medical concerns. No abnormalities or medical concerns noted.     - 21:34 - Consulted with Yazan Duncan CNP (Psychiatry)  Patient to be admitted to , primary nurse Marco Rivera informed, provider informed    - Report called to

## 2020-11-14 NOTE — PROGRESS NOTES
[facility] Biopsychosocial Assessment    Current Level of Psychosocial Functioning     Independent   XXX  Dependent    Minimal Assist     Comments:      Psychosocial High Risk Factors (check all that apply)    Unable to obtain meds XXX   Chronic illness/pain    Substance abuse   Lack of Family Support   Financial stress   XXX  Isolation   Inadequate Community Resources  Suicide attempt(s)  XXX   Not taking medications  XXX  Victim of crime   Developmental Delay  Unable to manage personal needs    Age 72 or older   Homeless  No transportation   Readmission within 30 days  Unemployment  XXX  Traumatic Event      Psychiatric Advanced Directive:     Family to involve in treatment: None    Sexual Orientation:  Heterosexual    Patient Strengths: positive support, connection to outpatient provider    Patient Barriers: situational stressors related to living environment, her ex-boyfriend and her boyfriend, not medication compliant    Opiate education provided: N/A    Safety plan: On-going, contracts for safety, Q15 checks    CMHC/MH history: Currently connected to [employer], history of inpatient admissions as a teenager and more recently in July. Plan of Care: Medication management, group/individual therapies, family meetings, psycho -education, treatment team meetings to assist with stabilization    Initial Discharge Plan:  Patient will be discharged to her home in [facility]. Patient will be referred to [facility]. Clinical Summary:  Patient is a 21year old female that presents via [facility] from [facility] to the ED with increased suicidal ideation with a plan. Patient reports worsening suicidal thoughts following an argument with her boyfriend regarding her living situation and other stressors. This is patients second admission to . Patient reports another inpatient admission in December of 2019.      Patient denies alcohol and drug use, UDS reflects this. Patient currently connected to Dr. Josy Waller at [facility]. Patient reports medication compliance until she missed an appointment a month ago and  did not refill medications. Patient has been off her medications since then. Patient currently lives with her ex  Boyfriend and another couple. Patient reports this as a stressor in her current relationship, patient had an argument with her boyfriend prior to going to [employer] yesterday. Patient reports her mother and her brother as her support system. Patient shares stress over recent unemployment.
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
## 2020-11-14 NOTE — H&P
Department of Psychiatry  Psychiatric Assessment      CHIEF COMPLAINT:  Suicidal Ideations      HISTORY OF PRESENT ILLNESS:     Patient is a 21year old female who presents to the ED on [facility] from [facility] reporting suicidal ideation with plan. Per [facility]: \"SW Amrit assessed tonight for for suicidal ideations with a plan and intent. Client stated to both Dr. Mckayla Guevara and Sharad Daley that she's been collecting pills and razors for when she's \"ready\". Client has a history of cutting and was psychiatrically hospitalized in September, client has not taken her medication in a month. Client would benefit from inpatient treatment. \" Patient admits to having increased suicidal thoughts for the past month, reports she missed her appointment with her psychiatrist and she was unable to receive her medications. Patient reports that she has been having increased relationship issues as she is currently residing with her ex-boyfriend, they are still sleeping in the same room, this has created issues with her current boyfriend. Patient denies current suicidal ideation, reports she was just feeling overwhelmed with her current situation. Patient does admit to having increased depression for the past month due to not having her psychotropics. Homicidal thoughts and/or plans denied. No delusions noted. Hallucinations denied. AOD denied. Upon admission to E4 psychiatric unit:  Leeann was last discharged from E4 on 7-20-20. Denies feelings of harm towards self or others currently. Reports being off her meds, Prozac, Abilify, Buspar for past 4 weeks and reports having increased depression. States she wants to restart her meds as felt they worked well for her. Reports having relationship issues and is living with ex boyfriend. Denies having a current boyfriend to this provider as reported to [facility]. Reports appetite is good and sleeps \"fair\" appears to have slept well since arrival to unit.  Labs reviewed drawn 11-13-20 reflect for dizziness, weakness and headaches. All other systems reviewed and are negative. PHYSICAL EXAM:  Vitals:   /80   Pulse 100   Temp 97.1 °F (36.2 °C) (Tympanic)   Resp 18   Ht 5' 1\" (1.549 m)   Wt 167 lb (75.8 kg)   SpO2 97%   BMI 31.55 kg/m²      Physical Exam:     Constitutional: Well developed, well nourished, no acute distress  Eyes: Pupils round and reactive to light bilaterally  Neck:  Supple, no thyromegaly. Cardiovascular:  Normal rate and rhythm, normal S1 and S2. No murmur or gallop on auscultation. Radial pulses 2+ and brisk bilaterally  Lungs: Clear to auscultation bilaterally without wheezing or rales. Musculoskeletal:  Full range of motion in all four extremities. Neurologic:  Cranial nerves II through XII are grossly intact. Normal gait and station.          Mental Status Examination:    Level of consciousness:  within normal limits  Appearance:  well-appearing, hospital attire , in chair, good grooming and good hygiene  Behavior/Motor:  no abnormalities noted  Attitude toward examiner:  cooperative, attentive and good eye contact  Speech:  spontaneous, normal rate and normal volume  Mood: Euthymic  Affect:  Reactive  Thought processes:  linear, goal directed and coherent  Thought content:  Denies homicidal ideation  Suicidal Ideation:  denies suicidal ideation  Delusions:  no evidence of delusions  Perceptual Disturbance:  denies any perceptual disturbance  Cognition: Patient is oriented to person, place, time and situation  Concentration: clinically adequate  Memory: intact  Insight & Judgement: Poor           DSM-5 DIAGNOSIS:        Patient Active Problem List   Diagnosis    Major Depressive Disorder recurrent severe without psychosis  NARINDER  PTSD            Psychosocial and Contextual Factors:      Relational     Past Medical History        Past Medical History:   Diagnosis Date               TREATMENT PLAN  Risk Management:  close watch per standard protocol  Psychotherapy:  participation in milieu and group and individual sessions with Attending Physician,  and Physician Assistant/CNP  Reason for Admission to Psychiatric Unit:  Threat to self  Estimated length of stay:  Greater than two midnights will be required to reach therapeutic levels of medications and to stabilize mood to where step down and management in a less restrictive environment is appropriate        GENERAL PATIENT/FAMILY EDUCATION  Patient will understand basic signs and symptoms, Patient will understand benefits/risks and potential side effects from proposed meds and Patient will understand their role in recovery. Family is  active in patient's care. Patient assets that may be helpful during treatment include: Intent to participate and engage in treatment, sufficient fund of knowledge and intellect to understand and utilize treatments. Goals:    Encouraged patient to engage in groups, milieu, and individual therapies offered as part of programing. Restart Porzac 20mg po q am  Restart Abilify 5mg po q hs  Restart Buspar 5mg po TID     Behavioral Services  Medicare Certification Upon Admission     I certify that this patient's inpatient psychiatric hospital admission is medically necessary for:   X (1) Treatment which could reasonably be expected to improve this patient's condition,       X (2) Or for diagnostic study;      AND     X (2) The inpatient psychiatric services are provided while the individual is under the care of a physician and are included in the individualized plan of care. Estimated length of stay/service: Greater than two midnights will be required to reach therapeutic levels of medications and to stabilize mood     Plan for post-hospital care: Follow up with outpatient psychiatric services       Chris Garcia is a 21 y.o. female being evaluated by a Virtual Visit (video visit) encounter to address concerns as mentioned above.   A caregiver was present in the room along with the patient. Pursuant to the emergency declaration under the [facility], [address] [place] [address] [facility] authority and the [employer] and [employer] Act, this Virtual Visit was conducted with patient's (and/or legal guardian's) consent, to reduce the patient's risk of exposure to COVID-19 and provide necessary medical care. Services were provided through a video synchronous discussion virtually to substitute for in-person visit by provider. Patient is present at [address] [place] [address], [facility] and I am physically present at [place], [place]    --Valentine Palacios MD on 11/14/2020 at 8:06 PM    An electronic signature was used to authenticate this note. **This report has been created using voice recognition software. It may contain minor errors which are inherent in voice recognition technology. **
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
## 2020-11-14 NOTE — PLAN OF CARE
Problem: Suicide risk  Goal: Provide patient with safe environment  Description: Provide patient with safe environment  Outcome: Ongoing  Patient provided with a safe environment. Problem: Depressive Behavior With or Without Suicide Precautions:  Goal: Able to verbalize and/or display a decrease in depressive symptoms  Description: Able to verbalize and/or display a decrease in depressive symptoms  Outcome: Ongoing  Note: Rates her mood 4 out of 10 with 10 being the best mood. Denies anxiety. Reports having depression. Goal: Ability to disclose and discuss suicidal ideas will improve  Description: Ability to disclose and discuss suicidal ideas will improve  Outcome: Ongoing  Denies suicidal thoughts. Goal: Able to verbalize support systems  Description: Able to verbalize support systems  Outcome: Ongoing  Reports her friends and family as her support. Goal: Absence of self-harm  Description: Absence of self-harm  Outcome: Ongoing  Free from self harming behaviors. Goal: Patient specific goal  Description: Patient specific goal  Outcome: Ongoing  Goal: Participates in care planning  Description: Participates in care planning  Outcome: Ongoing  Care plan reviewed with patient. Patient does verbalize understanding of the plan of care and does not contribute to goal setting      Problem: Discharge Planning:  Goal: Discharged to appropriate level of care  Description: Discharged to appropriate level of care  Outcome: Ongoing  Discharge planning in process.

## 2020-11-14 NOTE — ED NOTES
Pt in bed side rail up x1. Denies needs at this time. [place] police monitoring, will continue to monitor.            Promise Hernandez  11/14/20 0027

## 2020-11-14 NOTE — ED NOTES
Pt in bed side rail up x1. Updated on POC. Given pt phone to call family, denies any other needs at this time. [place] police monitoring, will continue to monitor.         Carlos Jacobsen  11/14/20 0209

## 2020-11-14 NOTE — ED NOTES
Pt in bed, no needs expressed at this time. [place] security monitoring, will continue to monitor.       Leilani Gilliland  11/13/20 1890
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
## 2020-11-14 NOTE — PATIENT CARE CONFERENCE
have follow-up providers? Yes, patient currently connected with [facility]  4. Do you have the ability to pay for your medications? Yes, patient has [employer]  5. Where will you be residing when you leave the hospital?  Patient will return to her home with her roommates. 6. Will need a return to work slip or FMLA paper completion?   No      GOALS UPDATE:   Time frame for Short-Term Goals: Daily    [place]

## 2020-11-14 NOTE — ED NOTES
ED to inpatient nurses report    Chief Complaint   Patient presents with    Suicidal      Present to ED from [facility] AT [facility]  LOC: alert and orientated to name, place, date  Vital signs   Vitals:    11/13/20 1939 11/14/20 0015   BP: 129/81 126/84   Pulse: 111 98   Resp: 20 18   Temp: 98.9 °F (37.2 °C) 98.7 °F (37.1 °C)   TempSrc: Oral Oral   SpO2: 98% 99%   Weight: 167 lb (75.8 kg)    Height: 5' 1\" (1.549 m)       Oxygen Baseline 99% RA    Current needs required none Bipap/Cpap No  LDAs:    Mobility: Independent  Pending ED orders: none  Present condition: stable  Electronically signed by Karla Brantley on 11/14/2020 at 3:20 AM       Karla Brantley  11/14/20 0321

## 2020-11-15 PROCEDURE — 99232 SBSQ HOSP IP/OBS MODERATE 35: CPT | Performed by: PSYCHIATRY & NEUROLOGY

## 2020-11-15 PROCEDURE — 6370000000 HC RX 637 (ALT 250 FOR IP): Performed by: PSYCHIATRY & NEUROLOGY

## 2020-11-15 PROCEDURE — 6370000000 HC RX 637 (ALT 250 FOR IP): Performed by: NURSE PRACTITIONER

## 2020-11-15 PROCEDURE — 90833 PSYTX W PT W E/M 30 MIN: CPT | Performed by: PSYCHIATRY & NEUROLOGY

## 2020-11-15 PROCEDURE — 1240000000 HC EMOTIONAL WELLNESS R&B

## 2020-11-15 PROCEDURE — APPSS15 APP SPLIT SHARED TIME 0-15 MINUTES: Performed by: NURSE PRACTITIONER

## 2020-11-15 RX ADMIN — BUSPIRONE HYDROCHLORIDE 5 MG: 5 TABLET ORAL at 15:24

## 2020-11-15 RX ADMIN — FLUOXETINE HYDROCHLORIDE 20 MG: 20 CAPSULE ORAL at 08:35

## 2020-11-15 RX ADMIN — BUSPIRONE HYDROCHLORIDE 5 MG: 5 TABLET ORAL at 08:35

## 2020-11-15 RX ADMIN — BUSPIRONE HYDROCHLORIDE 5 MG: 5 TABLET ORAL at 20:33

## 2020-11-15 RX ADMIN — TRAZODONE HYDROCHLORIDE 50 MG: 50 TABLET ORAL at 20:33

## 2020-11-15 RX ADMIN — ARIPIPRAZOLE 5 MG: 5 TABLET ORAL at 20:33

## 2020-11-15 ASSESSMENT — PAIN SCALES - GENERAL: PAINLEVEL_OUTOF10: 0

## 2020-11-15 NOTE — PROGRESS NOTES
Psychiatry Progress Note      CC: Major Depressive Disorder recurrent severe without psychosis  NARINDER  PTSD                   Subjective    Progress:  Alexia reports feeling better today. Reports depression is less. Denies feelings of harm towards self or others. Reports meds are starting to work. Denies having side effects. Good med compliance is verified. Reports appetite is good and slept well last night. Verified slept 7 hours. Denies going to groups yesterday. Denies getting any visits. Reports talked to her male friend on phone yesterday. Expects him to visit today. Objective  /66   Pulse 101   Temp 96.6 °F (35.9 °C) (Tympanic)   Resp 16   Ht 5' 1\" (1.549 m)   Wt 167 lb (75.8 kg)   SpO2 97%   BMI 31.55 kg/m²      MSE:  Level of consciousness: Alert  Appearance: hospital attire, in chair and fair grooming   Behavior/Motor: no abnormalities noted   Attitude toward examiner: cooperative   Speech: Normal volume, goal directed, NRR  Mood: Euthymic  Affect: Reactive  Thought processes: Linear and goal directed   Suicidal Ideation: Denies suicidal ideations  Homicidal ideation: Denies homicidal ideations  Delusions: No evidence of delusions is observed  Perceptual Disturbance: Denies AH/VH;  No evidence of psychosis is observed. Cognition: Oriented to person, place, time and situation   Concentration fair   Memory intact   Insight: Improved  Judgment: Improved    Assessment:  Major Depressive Disorder recurrent severe without psychosis  NARINDER  PTSD    Plan:  Continue current meds as ordered  Continue to encourage group attendance. Iliana Ram CNP  11-                                        Psychiatry Attending Attestation     I assessed this patient and reviewed the case and plan of care with Iliana Ram CNP. I have reviewed the above documentation and I agree with the findings and treatment plan with the following updates.   Patient was seen by Iliana Ram CNPon

## 2020-11-15 NOTE — GROUP NOTE
Group Therapy Note    Date: 11/15/2020    Group Start Time: 0900  Group End Time: 0930  Group Topic: [employer]    [facility]    [facility]        Group Therapy Note    Attendees: 9         Patient's Goal:  To stay awake    Notes:  Pt was given max prompting to come to the group, but remained in bed. Pt did arrive to the group late. Pt is noted to require increased prompting from staff in order to participate in group therapy session appropriately. Status After Intervention:  Improved    Participation Level:  Active Listener and Minimal    Participation Quality: Appropriate and Attentive      Speech:  hesitant      Thought Process/Content: Linear      Affective Functioning: Flat      Mood: dysphoric      Level of consciousness:  Alert, Oriented x4 and Attentive      Response to Learning: Able to verbalize current knowledge/experience, Able to verbalize/acknowledge new learning, Able to retain information and Progressing to goal      Endings: None Reported    Modes of Intervention: Education, Support, Socialization, Exploration, Clarifying, Activity, Limit-setting and Reality-testing      Discipline Responsible: Psychoeducational Specialist      Signature:  Frida Linares

## 2020-11-15 NOTE — PLAN OF CARE
in care planning  Description: Participates in care planning  11/14/2020 2229 by Solo Diaz RN  Outcome: Ongoing  Note: Care plan reviewed with patient and fair understanding verbalized. 11/14/2020 1057 by Soraida Bowen RN  Outcome: Ongoing     Problem: Discharge Planning:  Goal: Discharged to appropriate level of care  Description: Discharged to appropriate level of care  11/14/2020 2229 by Solo Diaz RN  Outcome: Ongoing  Note: Patient states she plans to return home with her roommates at discharge and follow up with [facility]. 11/14/2020 1057 by Soraida Bowen RN  Outcome: Ongoing   Care plan reviewed with patient.   Patient does verbalize understanding of the plan of care and does not contribute to goal setting

## 2020-11-15 NOTE — PLAN OF CARE
Problem: Depressive Behavior With or Without Suicide Precautions:  Goal: Able to verbalize and/or display a decrease in depressive symptoms  Description: Able to verbalize and/or display a decrease in depressive symptoms  11/15/2020 1129 by Juve Zavaleta RN  Outcome: Ongoing  Note: Rates her mood 6 out of 10 with 10 being the best mood. Denies anxiety. Reports depression. 11/14/2020 2229 by Zoey Chang RN  Outcome: Ongoing  Note: Patient noted with a blunt affect and brightens slightly with interaction. Goal: Ability to disclose and discuss suicidal ideas will improve  Description: Ability to disclose and discuss suicidal ideas will improve  11/15/2020 1129 by Juve Zavaleta RN  Outcome: Ongoing  Note: Denies suicidal thoughts. 11/14/2020 2229 by Zoey Chang RN  Outcome: Ongoing  Note: Patient denies any suicidal thoughts so far this shift. Goal: Absence of self-harm  Description: Absence of self-harm  11/15/2020 1129 by Juve Zavaleta RN  Outcome: Ongoing  Note: Free from self harming behaviors. 11/14/2020 2229 by Zoey Chang RN  Outcome: Ongoing  Note: No self harm noted so far this shift. Goal: Patient specific goal  Description: Patient specific goal  11/15/2020 1129 by Juve Zavaleta RN  Outcome: Ongoing  Note: Stay out of room more. Go to groups. 11/14/2020 2229 by Zoey Chang RN  Outcome: Ongoing  Note: Patient did not state a goal this shift. Goal: Participates in care planning  Description: Participates in care planning  11/15/2020 1129 by Juve Zavaleta RN  Outcome: Ongoing  Note: Care plan reviewed with patient. Patient does verbalize understanding of the plan of care and does contribute to goal setting   11/14/2020 2229 by Zoey Chang RN  Outcome: Ongoing  Note: Care plan reviewed with patient and fair understanding verbalized.      Problem: Discharge Planning:  Goal: Discharged to appropriate level of care  Description: Discharged to appropriate level of care  11/15/2020 1129 by Lauren Garcia RN  Outcome: Ongoing  Note: Discharge planning in process. 11/14/2020 2229 by Brittany Casiano RN  Outcome: Ongoing  Note: Patient states she plans to return home with her roommates at discharge and follow up with [facility]. Problem: Suicide risk  Goal: Provide patient with safe environment  Description: Provide patient with safe environment  11/14/2020 2315 by Brittany Casiano, RN  Outcome: Completed  11/14/2020 2229 by Brittany Casiano, RN  Outcome: Ongoing  Note: Patient maintained in a safe environment. 15 minute visual checks continued. Problem: Depressive Behavior With or Without Suicide Precautions:  Goal: Able to verbalize support systems  Description: Able to verbalize support systems  11/15/2020 1129 by Lauren Garcia RN  Outcome: Completed  11/14/2020 2229 by Brittany Casiano RN  Outcome: Ongoing  Note: Patient states her family is her current support  system.

## 2020-11-15 NOTE — GROUP NOTE
Group Therapy Note    Date: 11/15/2020    Group Start Time: 0930  Group End Time: 1000  Group Topic: Group Therapy    [facility]    Benjamin Lemons, CTRS        Group Therapy Note    Attendees: 9         Patient's Goal:   Pt to participate in the \"Just Right\" challenge handout in order to identify areas that are unbalanced and develop healthy habits. Notes:  Pt is hesitant to engage in group therapy conversation and requires increased verbal prompting. Pt is noted to report she has a tendency to oversleep to \"avoid her negative thoughts\". Pt reports that she misses drawing, and she has not done this in a long time. Pt is educated on the importance of participating in recreational supports and finding positive peer support. Status After Intervention:  Improved    Participation Level:  Active Listener and Interactive    Participation Quality: Appropriate and Attentive      Speech:  hesitant      Thought Process/Content: Linear      Affective Functioning: Flat      Mood: euthymic      Level of consciousness:  Alert, Oriented x4, Attentive and Drowsy      Response to Learning: Able to verbalize current knowledge/experience, Able to verbalize/acknowledge new learning, Able to retain information and Progressing to goal      Endings: None Reported    Modes of Intervention: Education, Support, Socialization, Exploration, Clarifying, Activity, Limit-setting and Reality-testing      Discipline Responsible: Psychoeducational Specialist      Signature:  Jada Katz

## 2020-11-16 PROBLEM — F33.2 SEVERE EPISODE OF RECURRENT MAJOR DEPRESSIVE DISORDER, WITHOUT PSYCHOTIC FEATURES (HCC): Status: ACTIVE | Noted: 2020-11-14

## 2020-11-16 PROCEDURE — 6370000000 HC RX 637 (ALT 250 FOR IP): Performed by: NURSE PRACTITIONER

## 2020-11-16 PROCEDURE — APPSS30 APP SPLIT SHARED TIME 16-30 MINUTES: Performed by: PHYSICIAN ASSISTANT

## 2020-11-16 PROCEDURE — 6370000000 HC RX 637 (ALT 250 FOR IP): Performed by: PSYCHIATRY & NEUROLOGY

## 2020-11-16 PROCEDURE — 1240000000 HC EMOTIONAL WELLNESS R&B

## 2020-11-16 PROCEDURE — 99232 SBSQ HOSP IP/OBS MODERATE 35: CPT | Performed by: PSYCHIATRY & NEUROLOGY

## 2020-11-16 PROCEDURE — 90833 PSYTX W PT W E/M 30 MIN: CPT | Performed by: PSYCHIATRY & NEUROLOGY

## 2020-11-16 RX ADMIN — ARIPIPRAZOLE 5 MG: 5 TABLET ORAL at 20:41

## 2020-11-16 RX ADMIN — BUSPIRONE HYDROCHLORIDE 5 MG: 5 TABLET ORAL at 14:28

## 2020-11-16 RX ADMIN — BUSPIRONE HYDROCHLORIDE 5 MG: 5 TABLET ORAL at 20:41

## 2020-11-16 RX ADMIN — FLUOXETINE HYDROCHLORIDE 20 MG: 20 CAPSULE ORAL at 08:13

## 2020-11-16 RX ADMIN — TRAZODONE HYDROCHLORIDE 50 MG: 50 TABLET ORAL at 20:41

## 2020-11-16 RX ADMIN — BUSPIRONE HYDROCHLORIDE 5 MG: 5 TABLET ORAL at 08:12

## 2020-11-16 ASSESSMENT — PAIN SCALES - GENERAL: PAINLEVEL_OUTOF10: 0

## 2020-11-16 NOTE — BH NOTE
Group Therapy Note    Date: 11/16/2020  Start Time:  1000  End Time:   3092    Number of Participants:    7    Type of Group: Recreational/Social    Patient's Goal:   Increase socialization and self-esteem. Notes:    Patient participated in a self-esteem drawing activity and was social with others.      Status After Intervention:  Improved    Participation Level: Interactive    Participation Quality: Appropriate, Attentive and Sharing      Speech:  normal      Thought Process/Content: Logical      Affective Functioning: Congruent      Mood: euthymic      Level of consciousness:  Oriented x4      Response to Learning: Progressing to goal      Endings: None Reported    Modes of Intervention: Socialization and Activity      Discipline Responsible: Psychoeducational Specialist      Signature:  Kelsea Hall

## 2020-11-16 NOTE — PROGRESS NOTES
Group Therapy Note    Date: 11/16/2020  Start Time: 1330  End Time:  8598  Number of Participants: 7    Type of Group: Psychotherapy        Notes: Pt is present for group. Peers shared their personal experience with mental health issues. Identified with common experiences. Peers explored gratitude and how it is helpful in disputing hopeless thought patterns. Status After Intervention:  Improved    Participation Level:  Active Listener and Interactive    Participation Quality: Appropriate, Attentive, Sharing and Supportive      Speech:  normal      Thought Process/Content: Logical  Linear      Affective Functioning: Congruent      Mood: dysphoric      Level of consciousness:  Alert, Oriented x4 and Attentive      Response to Learning: Able to verbalize current knowledge/experience, Able to verbalize/acknowledge new learning, Able to retain information, Capable of insight, Able to change behavior and Progressing to goal      Endings: None Reported    Modes of Intervention: Education, Support, Socialization, Exploration and Clarifying      Discipline Responsible: /Counselor      Signature:  WILI Baker

## 2020-11-16 NOTE — PROGRESS NOTES
Department of Psychiatry  Progress Note     Chief Complaint:  Severe episode of recurrent major depressive disorder, without psychotic features ([address]     SUBJECTIVE:    PROGRESS:  Leeann reports she is doing [place]" today  She rates her mood a 6/10 with 10 being the best   She continues to feel a little depressed today  Denies any anxiety today  She states that she is having a hard time with her roommate Milagro Vasquez and male friend James Caputo. She states she wants to tell James Harshal that she doesn't want to be in a relationship with him but is having a hard time doing so. She reports he has been calling and her mood does down when he calls. She also says that he has been visiting and claiming he is her boyfriend which has been confusing for her. She feels that Milagro Vasquez wants her to live with him but at the same time is stand-offish to her. She endorses fleeting suicidal ideation without plan and intent. Able to contract for safety on the unit  Continues to feel hopeless and helpless at times but those thoughts are improving  She reports she slept okay last night. Slept 8 hours broken per staff. Appetite has been good on the unit  Endorses good medication compliance. Denies side effects. She has been attending groups on the unit and feels they are helpful for her. Has been talking on the phone and visiting with James Caputo.      Suicidal ideations: fleeting without plan and intent   Compliance with medications: good   Medication side effects: absent  ROS: Patient has new complaints:  no  Sleep quality: 8 hours broken  Attending groups: yes      OBJECTIVE      Medications  Current Facility-Administered Medications: acetaminophen (TYLENOL) tablet 650 mg, 650 mg, Oral, Q4H PRN  ibuprofen (ADVIL;MOTRIN) tablet 400 mg, 400 mg, Oral, Q6H PRN  hydrOXYzine (ATARAX) tablet 50 mg, 50 mg, Oral, TID PRN  traZODone (DESYREL) tablet 50 mg, 50 mg, Oral, Nightly PRN  magnesium hydroxide (MILK OF MAGNESIA) 400 MG/5ML suspension 30 mL, 30 mL, Oral, Daily PRN  aluminum & magnesium hydroxide-simethicone (MAALOX) 200-200-20 MG/5ML suspension 30 mL, 30 mL, Oral, Q6H PRN  FLUoxetine (PROZAC) capsule 20 mg, 20 mg, Oral, Daily  busPIRone (BUSPAR) tablet 5 mg, 5 mg, Oral, TID  ARIPiprazole (ABILIFY) tablet 5 mg, 5 mg, Oral, Nightly     Physical     height is 5' 1\" (1.549 m) and weight is 167 lb (75.8 kg). Her oral temperature is 96.9 °F (36.1 °C). Her blood pressure is 108/62 and her pulse is 79. Her respiration is 16 and oxygen saturation is 94%. Lab Results   Component Value Date    WBC 12.3 (H) 11/13/2020    HGB 13.0 11/13/2020    HCT 41.7 11/13/2020     11/13/2020    ALT 35 11/13/2020    AST 20 11/13/2020     11/13/2020    K 4.2 11/13/2020     11/13/2020    CREATININE 0.6 11/13/2020    BUN 11 11/13/2020    CO2 26 11/13/2020    TSH 1.440 11/13/2020    INR 1.03 06/28/2018          Mental Status Exam:   Level of consciousness: Alert  Appearance: wearing pajamas, in chair and fair grooming   Behavior/Motor: no abnormalities noted   Attitude toward examiner: cooperative   Speech: Normal volume, goal directed, NRR  Mood: \"Okay\"   Affect: Reactive  Thought processes: Linear and goal directed   Suicidal Ideation:fleeting without plan and intent  Homicidal ideation: Denies homicidal ideations  Delusions: No evidence of delusions is observed  Perceptual Disturbance: Denies AH/VH;  No evidence of psychosis is observed. Cognition: Oriented to person, place, time and situation   Concentration fair   Memory intact   Insight: Improved  Judgment: Improved    ASSESSMENT     Severe episode of recurrent major depressive disorder, without psychotic features ([address]   NARINDER  PTSD  Cluster B traits    PLAN    Patient's symptoms show some improvement today   Medication adjustments: Continue same medications and observe  Encouraged group attendance  Attempt to develop insight, psycho-education and supportive therapy conducted.     Probable discharge: TBD   Follow-up:

## 2020-11-16 NOTE — PLAN OF CARE
Problem: Depressive Behavior With or Without Suicide Precautions:  Goal: Ability to disclose and discuss suicidal ideas will improve  Description: Ability to disclose and discuss suicidal ideas will improve  11/15/2020 2125 by Rafal Aguirre RN  Outcome: Met This Shift  Note: Pt denies suicidal thoughts  11/15/2020 1129 by Vinh Ramos RN  Outcome: Ongoing  Note: Denies suicidal thoughts. Goal: Absence of self-harm  Description: Absence of self-harm  11/15/2020 2125 by Rafal Aguirre RN  Outcome: Met This Shift  Note: Pt denies any new self harm, denies having urge to cut or harm self  11/15/2020 1129 by Vinh Ramos RN  Outcome: Ongoing  Note: Free from self harming behaviors. Goal: Patient specific goal  Description: Patient specific goal  11/15/2020 2125 by Rafal Aguirre RN  Outcome: Met This Shift  Note: Reports met her goal of being up and out of her room  11/15/2020 1129 by Vinh Ramos RN  Outcome: Ongoing  Note: Stay out of room more. Go to groups. Goal: Participates in care planning  Description: Participates in care planning  11/15/2020 2125 by Rafal Aguirre RN  Outcome: Met This Shift  Note: Care plan reviewed with patient. Patient does verbalize understanding of the plan of care and does contribute to goal setting   11/15/2020 1129 by Vinh Ramos RN  Outcome: Ongoing  Note: Care plan reviewed with patient.   Patient does verbalize understanding of the plan of care and does contribute to goal setting      Problem: Anxiety:  Goal: Level of anxiety will decrease  Description: Level of anxiety will decrease  Outcome: Met This Shift  Note: Pt denies feeling anxious     Problem: Depressive Behavior With or Without Suicide Precautions:  Goal: Able to verbalize and/or display a decrease in depressive symptoms  Description: Able to verbalize and/or display a decrease in depressive symptoms  11/15/2020 2125 by Rafal Aguirre RN  Outcome: Ongoing  Note: Rates mood 5/10, blunt affect, good eye contact, is hopeful  11/15/2020 1129 by Katia Cervantes RN  Outcome: Ongoing  Note: Rates her mood 6 out of 10 with 10 being the best mood. Denies anxiety. Reports depression. Problem: Discharge Planning:  Goal: Discharged to appropriate level of care  Description: Discharged to appropriate level of care  11/15/2020 2125 by Db Sparks RN  Outcome: Ongoing  Note: Pt plans to return home with her 3 roommates, will follow up with [facility]  11/15/2020 1129 by Katia Cervantes RN  Outcome: Ongoing  Note: Discharge planning in process. Problem:  Activity:  Goal: Sleeping patterns will improve  Description: Sleeping patterns will improve  Outcome: Ongoing  Note: Pt reports she slept ok last night, had trazodone at bedtime     Problem: Depressive Behavior With or Without Suicide Precautions:  Goal: Able to verbalize support systems  Description: Able to verbalize support systems  11/15/2020 1129 by Katia Cervantes RN  Outcome: Completed

## 2020-11-17 PROCEDURE — APPSS30 APP SPLIT SHARED TIME 16-30 MINUTES: Performed by: PHYSICIAN ASSISTANT

## 2020-11-17 PROCEDURE — 6370000000 HC RX 637 (ALT 250 FOR IP): Performed by: PSYCHIATRY & NEUROLOGY

## 2020-11-17 PROCEDURE — 1240000000 HC EMOTIONAL WELLNESS R&B

## 2020-11-17 PROCEDURE — 99232 SBSQ HOSP IP/OBS MODERATE 35: CPT | Performed by: PSYCHIATRY & NEUROLOGY

## 2020-11-17 PROCEDURE — 6370000000 HC RX 637 (ALT 250 FOR IP): Performed by: NURSE PRACTITIONER

## 2020-11-17 RX ADMIN — BUSPIRONE HYDROCHLORIDE 5 MG: 5 TABLET ORAL at 09:10

## 2020-11-17 RX ADMIN — HYDROXYZINE HYDROCHLORIDE 50 MG: 25 TABLET ORAL at 21:21

## 2020-11-17 RX ADMIN — FLUOXETINE HYDROCHLORIDE 20 MG: 20 CAPSULE ORAL at 09:10

## 2020-11-17 RX ADMIN — BUSPIRONE HYDROCHLORIDE 5 MG: 5 TABLET ORAL at 21:21

## 2020-11-17 RX ADMIN — TRAZODONE HYDROCHLORIDE 50 MG: 50 TABLET ORAL at 21:21

## 2020-11-17 RX ADMIN — BUSPIRONE HYDROCHLORIDE 5 MG: 5 TABLET ORAL at 14:20

## 2020-11-17 RX ADMIN — ARIPIPRAZOLE 5 MG: 5 TABLET ORAL at 21:21

## 2020-11-17 ASSESSMENT — PAIN SCALES - GENERAL
PAINLEVEL_OUTOF10: 0
PAINLEVEL_OUTOF10: 0

## 2020-11-17 NOTE — BH NOTE
Group Therapy Note    Date: 11/17/2020  Start Time:  1000  End Time:   1050    Number of Participants:  4    Type of Group: Recreational/Self-Expression    Patient's Goal:  Increase self-expression and socialization. Notes:  Pt participated in [employer] and shared her beliefs, goals and ideas with the group.      Status After Intervention:  Improved    Participation Level: Interactive    Participation Quality: Appropriate, Attentive and Sharing      Speech:  hesitant      Thought Process/Content: Logical      Affective Functioning: Congruent      Mood: euthymic      Level of consciousness:  Oriented x4      Response to Learning: Progressing to goal      Endings: None Reported    Modes of Intervention: Socialization, Exploration and Activity      Discipline Responsible: Psychoeducational Specialist      Signature:  Nella Stanley

## 2020-11-17 NOTE — PLAN OF CARE
Problem: Depressive Behavior With or Without Suicide Precautions:  Goal: Patient specific goal  Description: Patient specific goal  11/16/2020 2244 by Katey Bartlett RN  Outcome: Met This Shift  Note: Met goal of talking to mom  11/16/2020 1240 by Marily Sam RN  Outcome: Ongoing  Note: Patient reports goal for today is to [facility] to my mom\", patient continues to work towards goal.    Goal: Participates in care planning  Description: Participates in care planning  11/16/2020 2244 by Katey Bartlett RN  Outcome: Met This Shift  Note: Care plan reviewed with patient. Patient does verbalize understanding of the plan of care and does contribute to goal setting   11/16/2020 1240 by Marily Sam RN  Outcome: Ongoing  Note: Patient discussed treatment plan with physician/medical staff, attending group, and compliant with medications. Problem: Depressive Behavior With or Without Suicide Precautions:  Goal: Able to verbalize and/or display a decrease in depressive symptoms  Description: Able to verbalize and/or display a decrease in depressive symptoms  11/16/2020 2244 by Katey Bartlett RN  Outcome: Ongoing  Note: Rates mood 7/10, states depression is better, blunt affect, does brighten with interaction, fair eye contact, is hopeful  11/16/2020 1240 by Marily Sam RN  Outcome: Ongoing  Note: Patient reports mood 6/10 with 10 being normal. Has flat affect, brightens with interaction. Speech clear. Good eye contact. Reports hope for future and identifies family and friends as her support system. Problem: Discharge Planning:  Goal: Discharged to appropriate level of care  Description: Discharged to appropriate level of care  11/16/2020 2244 by Katey Bartlett RN  Outcome: Ongoing  Note: Plans to return home with her roommates, will follow up at [facility]  11/16/2020 1240 by Marily Sam RN  Outcome: Ongoing  Note: Patient voices no needs before discharge.  Patient plans to be discharged to home with roommates. Discharge planner working with patient to achieve optimal discharge plans, specific to individual needs. Problem: Activity:  Goal: Sleeping patterns will improve  Description: Sleeping patterns will improve  11/16/2020 2244 by Minnie Lane RN  Outcome: Ongoing  Note: Pt reports she slept ok last night, had trazodone again tonight at bedtime  11/16/2020 1240 by Kiko Veloz RN  Outcome: Ongoing  Note: Patient slept 8 hours last night, reports she slept well. Encourage patient not to take naps during the day, relax several hours before bed and to take prescribed sleep meds as ordered. Patient verbalized understanding. Problem: Coping:  Goal: Ability to identify problematic behaviors that deter socialization will improve  Description: Ability to identify problematic behaviors that deter socialization will improve  Outcome: Ongoing  Note: Little peer interaction noted     Problem: Depressive Behavior With or Without Suicide Precautions:  Goal: Ability to disclose and discuss suicidal ideas will improve  Description: Ability to disclose and discuss suicidal ideas will improve  11/16/2020 2244 by Minnie Lane RN  Outcome: Completed  Note: Pt denies suicidal thoughts  11/16/2020 1240 by Kiko Veloz RN  Outcome: Ongoing  Note: Patient denies suicidal ideations, no plan or intent to harm self. Patient remains on suicidal precautions 15 checks for safety. Instructed to seek staff as needed for thoughts of self harm. Goal: Absence of self-harm  Description: Absence of self-harm  11/16/2020 2244 by Minnie Lane RN  Outcome: Completed  Note: No self harm, denies urge to self harm  11/16/2020 1240 by Kiko Veloz RN  Outcome: Ongoing  Note: No self harm behaviors were observed or reported so far this shift. Remains on every 15 minutes precautions for safety.        Problem: Anxiety:  Goal: Level of anxiety will decrease  Description: Level of anxiety will decrease  11/16/2020 2244 by Tory Cormier, RN  Outcome: Completed  Note: Pt denies feeling anxious  11/16/2020 1240 by Vanessa Austin RN  Outcome: Ongoing  Note: Patient denies anxiety so far this shift. Care plan reviewed with patient.   Patient does verbalize understanding of the plan of care and does contribute to goal setting

## 2020-11-17 NOTE — PROGRESS NOTES
Department of Psychiatry  Progress Note     Chief Complaint:  Severe episode of recurrent major depressive disorder, without psychotic features ([address]     SUBJECTIVE:    PROGRESS:  Leeann reports she is doing \"pretty good so far\" today  She rates her mood an 8/10 with 10 being the best   She continues to feel a little depressed today but states it is manageable. Denies any anxiety today  She states that she was able to talk to her friend Alec Guerrero yesterday and discussed with him that she does not want to be in a relationship with him. She reports it went well and she feels better today after talking with him about it. She denies suicidal ideation today. Did not have any last night  Continues to feel hopeless and helpless at times but those thoughts are improving  She reports she slept okay last night. States she had a little trouble staying asleep. Slept 8.5 hours continuous per staff. Appetite has been good on the unit  Endorses good medication compliance. Denies side effects. She has been attending a few groups on the unit and feels they are helpful for her. She has been overall been isolative to her room reading her book. Has been talking on the phone and visiting with Alec Guerrero.      Suicidal ideations: denies   Compliance with medications: good   Medication side effects: absent  ROS: Patient has new complaints:  no  Sleep quality: 8.5 hours continuous  Attending groups: yes      OBJECTIVE      Medications  Current Facility-Administered Medications: acetaminophen (TYLENOL) tablet 650 mg, 650 mg, Oral, Q4H PRN  ibuprofen (ADVIL;MOTRIN) tablet 400 mg, 400 mg, Oral, Q6H PRN  hydrOXYzine (ATARAX) tablet 50 mg, 50 mg, Oral, TID PRN  traZODone (DESYREL) tablet 50 mg, 50 mg, Oral, Nightly PRN  magnesium hydroxide (MILK OF MAGNESIA) 400 MG/5ML suspension 30 mL, 30 mL, Oral, Daily PRN  aluminum & magnesium hydroxide-simethicone (MAALOX) 200-200-20 MG/5ML suspension 30 mL, 30 mL, Oral, Q6H PRN  FLUoxetine (PROZAC) capsule 20 mg, 20 mg, Oral, Daily  busPIRone (BUSPAR) tablet 5 mg, 5 mg, Oral, TID  ARIPiprazole (ABILIFY) tablet 5 mg, 5 mg, Oral, Nightly     Physical     height is 5' 1\" (1.549 m) and weight is 167 lb (75.8 kg). Her oral temperature is 97.4 °F (36.3 °C). Her blood pressure is 134/78 and her pulse is 107. Her respiration is 16 and oxygen saturation is 99%. Lab Results   Component Value Date    WBC 12.3 (H) 11/13/2020    HGB 13.0 11/13/2020    HCT 41.7 11/13/2020     11/13/2020    ALT 35 11/13/2020    AST 20 11/13/2020     11/13/2020    K 4.2 11/13/2020     11/13/2020    CREATININE 0.6 11/13/2020    BUN 11 11/13/2020    CO2 26 11/13/2020    TSH 1.440 11/13/2020    INR 1.03 06/28/2018          Mental Status Exam:   Level of consciousness: Alert  Appearance: street clothes, seated in bed and fair grooming   Behavior/Motor: no abnormalities noted   Attitude toward examiner: cooperative   Speech: Normal volume, goal directed, NRR  Mood: \"Okay\"   Affect: Reactive  Thought processes: Linear and goal directed   Suicidal Ideation: denies  Homicidal ideation: Denies homicidal ideations  Delusions: No evidence of delusions is observed  Perceptual Disturbance: Denies AH/VH;  No evidence of psychosis is observed. Cognition: Oriented to person, place, time and situation   Concentration fair   Memory intact   Insight: Improved  Judgment: Improved    ASSESSMENT     Severe episode of recurrent major depressive disorder, without psychotic features ([facility] [address]   NARINDER  PTSD  Cluster B traits    PLAN    Patient's symptoms show some improvement today   Medication adjustments: Continue same medications and observe  Encouraged group attendance  Attempt to develop insight, psycho-education and supportive therapy conducted.     Probable discharge: 1-2 days   Follow-up: Dr. Mildred Rangel at [facility]    Electronically signed by Maykel Harris PA-C on 11/17/2020 at 9:15 AM Reviewed patient's current plan of care and

## 2020-11-17 NOTE — BH NOTE
PLAN OF CARE:     Start Time: 0900  End Time:   0920    Group Topic:  Daily Goals    Group Type:   Goal Group    Intervention/Goal:  To increase support and identify daily goals    Attendance:  Attended group    Affect:   bright    Behavior:  cooperative    Response:  appropriate    Daily Goal:   To call a friend.      Progress:   Progressing to goal

## 2020-11-17 NOTE — PLAN OF CARE
Problem: Depressive Behavior With or Without Suicide Precautions:  Goal: Able to verbalize and/or display a decrease in depressive symptoms  Description: Able to verbalize and/or display a decrease in depressive symptoms  11/17/2020 1223 by Mai Wilde RN  Outcome: Ongoing  Note: Patient reports mood 8/10 with 10 being normal. Has bright affect. Speech clear. Good eye contact. Reports hope for future and identifies friends and family as her support system. Problem: Depressive Behavior With or Without Suicide Precautions:  Goal: Patient specific goal  Description: Patient specific goal  11/17/2020 1223 by Mai Wilde RN  Outcome: Ongoing  Note: Patient reports goal for today is to call a friend. Patient continues to work towards goal.       Problem: Depressive Behavior With or Without Suicide Precautions:  Goal: Participates in care planning  Description: Participates in care planning  11/17/2020 1223 by Mai Wilde RN  Outcome: Ongoing  Note: Patient discussed treatment plan with physician/medical staff, attending group, and compliant with medications. Problem: Discharge Planning:  Goal: Discharged to appropriate level of care  Description: Discharged to appropriate level of care  11/17/2020 1223 by Mai Wilde RN  Outcome: Ongoing  Note: Patient voices no needs before discharge. Patient plans to be discharged to home with roommate. Discharge planner working with patient to achieve optimal discharge plans, specific to individual needs. Problem: Activity:  Goal: Sleeping patterns will improve  Description: Sleeping patterns will improve  11/17/2020 1223 by Mai Wlide RN  Outcome: Ongoing  Note: Patient slept 8.5 hours last night, reports she slept well. Encourage patient not to take naps during the day, relax several hours before bed and to take prescribed sleep meds as ordered. Patient verbalized understanding. Care plan reviewed with patient.   Patient verbalize understanding of the plan of care and contribute to goal setting.

## 2020-11-17 NOTE — BH NOTE
Group Therapy Note    Date: 11/16/2020  Start Time: 2000  End Time:  2020    Type of Group: Wrap-Up and relaxation    Patient's Goal:  Talk to my mom    Status After Intervention:  Unchanged    Participation Level:  Active Listener    Participation Quality: Attentive      Speech:  normal      Thought Process/Content: Logical      Affective Functioning: Blunted      Mood: depressed      Level of consciousness:  Alert      Endings: None Reported    Modes of Intervention: Socialization      Discipline Responsible: Registered Nurse      Signature:  Nadja Valdes RN

## 2020-11-18 VITALS
OXYGEN SATURATION: 98 % | SYSTOLIC BLOOD PRESSURE: 117 MMHG | TEMPERATURE: 96.2 F | HEART RATE: 89 BPM | RESPIRATION RATE: 16 BRPM | WEIGHT: 167 LBS | DIASTOLIC BLOOD PRESSURE: 78 MMHG | BODY MASS INDEX: 31.53 KG/M2 | HEIGHT: 61 IN

## 2020-11-18 PROCEDURE — 99239 HOSP IP/OBS DSCHRG MGMT >30: CPT | Performed by: PSYCHIATRY & NEUROLOGY

## 2020-11-18 PROCEDURE — 5130000000 HC BRIDGE APPOINTMENT

## 2020-11-18 PROCEDURE — 6370000000 HC RX 637 (ALT 250 FOR IP): Performed by: NURSE PRACTITIONER

## 2020-11-18 RX ORDER — FLUOXETINE HYDROCHLORIDE 20 MG/1
20 CAPSULE ORAL DAILY
Qty: 7 CAPSULE | Refills: 3 | Status: SHIPPED | OUTPATIENT
Start: 2020-11-18

## 2020-11-18 RX ORDER — BUSPIRONE HYDROCHLORIDE 5 MG/1
5 TABLET ORAL 3 TIMES DAILY
Qty: 21 TABLET | Refills: 2 | Status: SHIPPED | OUTPATIENT
Start: 2020-11-18

## 2020-11-18 RX ORDER — ARIPIPRAZOLE 5 MG/1
5 TABLET ORAL NIGHTLY
Qty: 7 TABLET | Refills: 3 | Status: SHIPPED | OUTPATIENT
Start: 2020-11-18

## 2020-11-18 RX ORDER — TRAZODONE HYDROCHLORIDE 50 MG/1
50 TABLET ORAL NIGHTLY PRN
Qty: 7 TABLET | Refills: 3 | Status: SHIPPED | OUTPATIENT
Start: 2020-11-18

## 2020-11-18 RX ADMIN — FLUOXETINE HYDROCHLORIDE 20 MG: 20 CAPSULE ORAL at 08:01

## 2020-11-18 RX ADMIN — BUSPIRONE HYDROCHLORIDE 5 MG: 5 TABLET ORAL at 08:01

## 2020-11-18 ASSESSMENT — PAIN SCALES - GENERAL: PAINLEVEL_OUTOF10: 0

## 2020-11-18 NOTE — PROGRESS NOTES
Behavioral Health   Discharge Note    Pt discharged with followings belongings:   Dentures: None  Vision - Corrective Lenses: None  Hearing Aid: None  Jewelry: Earrings  Body Piercings Removed: No  Clothing: Footwear, Pants, Shirt, Sweater, Undergarments (Comment), Socks  Were All Patient Medications Collected?: Not Applicable  Other Valuables: Cell phone, Erich Armendariz sent home with patient. Valuables retrieved from safe, Security envelope number:  N/A and returned to patient. Patient left department with staff via ambulatory. Discharged to private residence. \"An Important Message from [employer] About Your Rights\" form photocopy original from admission and provided to pt at discharge N/A. Patient education on aftercare instructions: YES  Bridge Appointment completed: Reviewed Discharge Instructions with patient. Patient verbalizes understanding and agreement with the discharge plan using the teachback method. Information faxed to [facility] by [facility] Rep Patient verbalize understanding of AVS:  YES.     Status EXAM upon discharge:  Status and Exam  Normal: Yes  Facial Expression: Brightened  Affect: Appropriate  Level of Consciousness: Alert  Mood:Normal: No  Mood: (euthymic)  Motor Activity:Normal: No  Motor Activity: Decreased  Interview Behavior: Cooperative  Preception: [place] to Person, Ronald Basque to Time, [place] to Place, [place] to Situation  Attention:Normal: Yes  Attention: Unable to Concentrate  Thought Processes: (logical)  Thought Content:Normal: Yes  Hallucinations: None  Delusions: No  Memory:Normal: Yes  Insight and Judgment: No  Insight and Judgment: Poor Insight, Poor Judgment  Present Suicidal Ideation: No  Present Homicidal Ideation: No    Truong Francis RN

## 2020-11-18 NOTE — PROGRESS NOTES
Group Therapy Note    Date: 11/17/2020  Start Time: 2000  End Time:  2020      Type of Group: Wrap-Up        Patient's Goal:  To call a friend    Notes:  Me    Status After Intervention:  Improved    Participation Level:  Active Listener    Participation Quality: Attentive      Speech:  normal      Thought Process/Content: Logical      Affective Functioning: Congruent      Mood: anxious      Level of consciousness:  Alert and Oriented x4      Response to Learning: Able to verbalize current knowledge/experience      Endings: None Reported    Modes of Intervention: Support and Socialization      Discipline Responsible: Registered Nurse      Signature:  Mariia Pace RN

## 2020-11-18 NOTE — DISCHARGE SUMMARY
Provider Discharge Summary     Patient ID:  Kyleigh Deleno  [id number]  89 y.o.  2000    Admit date: 11/13/2020    Discharge date and time: 11/18/2020  9:24 AM     Admitting Physician: Zenia Loja MD     Discharge Physician: Zenia Loja MD    Admission Diagnoses: Depression, major, recurrent ([facility] [F33.9]    Discharge Diagnoses:      Severe episode of recurrent major depressive disorder, without psychotic features [facility])     Patient Active Problem List   Diagnosis Code    Severe recurrent major depression without psychotic features ([facility] F33.2    NARINDER (generalized anxiety disorder) F41.1    PTSD (post-traumatic stress disorder) F43.10    Suicidal ideation R45.851    Severe episode of recurrent major depressive disorder, without psychotic features ([facility] F33.2        Admission Condition: poor    Discharged Condition: stable    Indication for Admission: threat to self    History of Present Illnes (present tense wording is of findings from admission exam and are not necessarily indicative of current findings):    Patient is a 21year old female who presents to the [address] [place] [address] with plan. Per EM: \"FRANCISCA Marcus assessed tonight for for suicidal ideations with a plan and intent. Client stated to both Dr. Viktoria Bro and Diana Tee that she's been collecting pills and razors for when she's \"ready\". Client has a history of cutting and was psychiatrically hospitalized in September, client has not taken her medication in a month. Client would benefit from inpatient treatment. \" Patient admits to having increased suicidal thoughts for the past month, reports she missed her appointment with her psychiatrist and she was unable to receive her medications.  Patient reports that she has been having increased relationship issues as she is currently residing with her ex-boyfriend, they are still sleeping in the same room, this has created issues with her CONTINUE these medications which have CHANGED    Details   busPIRone (BUSPAR) 5 MG tablet Take 1 tablet by mouth 3 times daily  Qty: 21 tablet, Refills: 2      FLUoxetine (PROZAC) 20 MG capsule Take 1 capsule by mouth daily  Qty: 7 capsule, Refills: 3      traZODone (DESYREL) 50 MG tablet Take 1 tablet by mouth nightly as needed for Sleep  Qty: 7 tablet, Refills: 3      ARIPiprazole (ABILIFY) 5 MG tablet Take 1 tablet by mouth nightly  Qty: 7 tablet, Refills: 3              Core Measures statement:   Not applicable    Discharge Exam:  Level of consciousness:  Within normal limits  Appearance: Street clothes, seated, with good grooming  Behavior/Motor: No abnormalities noted  Attitude toward examiner:  Cooperative, attentive, good eye contact  Speech:  spontaneous, normal rate, normal volume and well articulated  Mood:  euthymic  Affect:  Full range  Thought processes:  linear, goal directed and coherent  Thought content:  denies homicidal ideation  Suicidal Ideation:  denies suicidal ideation  Delusions:  no evidence of delusions  Perceptual Disturbance:  denies any perceptual disturbance  Cognition:  Intact  Memory: age appropriate  Insight & Judgement: fair  Medication side effects: denies     Disposition: home    Patient Instructions: Activity: activity as tolerated  1. Patient instructed to take medications regularly and follow up with outpatient appointments. Follow-up as scheduled with Arleen Al Fraction       Signed:    Electronically signed by Celia Collins MD on 11/18/20 at 9:24 AM EST    Time Spent on discharge is more than 35 minutes in the examination, evaluation, counseling and review of medications and discharge plan.

## 2020-11-18 NOTE — PLAN OF CARE
Problem: Depressive Behavior With or Without Suicide Precautions:  Goal: Able to verbalize and/or display a decrease in depressive symptoms  Description: Able to verbalize and/or display a decrease in depressive symptoms  11/17/2020 2340 by Frances Marmolejo RN  Outcome: Met This Shift  Note: Patient denies depressive symptoms. 11/17/2020 1223 by Shawn Welsh RN  Outcome: Ongoing  Note: Patient reports mood 8/10 with 10 being normal. Has bright affect. Speech clear. Good eye contact. Reports hope for future and identifies friends and family as her support system. Goal: Patient specific goal  Description: Patient specific goal  11/17/2020 2340 by Frances Marmolejo RN  Outcome: Met This Shift  Note: To call afriend  11/17/2020 1223 by Shawn Welsh RN  Outcome: Ongoing  Note: Patient reports goal for today is to call a friend. Patient continues to work towards goal.    Goal: Participates in care planning  Description: Participates in care planning  11/17/2020 2340 by Frances Marmolejo RN  Outcome: Met This Shift  Note: Care plan reviewed with patient. Patient does verbalize understanding of the plan of care and does contribute to goal setting . 11/17/2020 1223 by Shawn Welsh RN  Outcome: Ongoing  Note: Patient discussed treatment plan with physician/medical staff, attending group, and compliant with medications. Problem: Discharge Planning:  Goal: Discharged to appropriate level of care  Description: Discharged to appropriate level of care  11/17/2020 2340 by Frances Marmolejo RN  Outcome: Ongoing  Note: Discharge planning is in process. 11/17/2020 1223 by Shawn Welsh RN  Outcome: Ongoing  Note: Patient voices no needs before discharge. Patient plans to be discharged to home with roommate. Discharge planner working with patient to achieve optimal discharge plans, specific to individual needs. Problem:  Activity:  Goal: Sleeping patterns will improve  Description: Sleeping patterns will improve  11/17/2020 2340 by Ira Manriquez RN  Outcome: Completed  11/17/2020 1223 by Dayday Shafer RN  Outcome: Ongoing  Note: Patient slept 8.5 hours last night, reports she slept well. Encourage patient not to take naps during the day, relax several hours before bed and to take prescribed sleep meds as ordered. Patient verbalized understanding.         Problem: Coping:  Goal: Ability to identify problematic behaviors that deter socialization will improve  Description: Ability to identify problematic behaviors that deter socialization will improve  11/17/2020 1542 by Ziggy Raza  Outcome: Completed

## 2020-11-18 NOTE — SUICIDE SAFETY PLAN
SAFETY PLAN    A suicide Safety Plan is a document that supports someone when they are having thoughts of suicide. Warning Signs that indicate a suicidal crisis may be developing: What (situations, thoughts, feelings, body sensations, behaviors, etc.) do you experience that lets you know you are beginning to think about suicide? 1. Sleeping a lot  2. Wanting to isolate myself from everyone (not talking or wanting to participate)  3. Self harm    Internal Coping Strategies:  What things can I do (relaxation techniques, hobbies, physical activities, etc.) to take my mind off my problems without contacting another person? 1. Listening to medication videos/watch ASMR videos  2. drawing  3. Listening to [address] and social settings that provide distraction: Who can I call or where can I go to distract me? 1. Name: Roman Santizo  Phone: [phone]  2. Name: Miguel Reynoso  Phone: [phone]   6. Place: my brother's house            4. Place: pace around the living room    People whom I can ask for help: Who can I call when I need help - for example, friends, family, clergy, someone else? 1. Name: my mom                Phone: [phone]  2. Name: Amanda  Phone: [phone]  3. Name: Lorie Lyon  Phone:     Professionals or [employer] agencies I can contact during a crisis: Who can I call for help - for example, my doctor, my psychiatrist, my psychologist, a mental health provider, a suicide hotline? 1. Clinician Name:    Phone:       Clinician Pager or Emergency Contact #:     2. Clinician Name:    Phone:       Clinician Pager or Emergency Contact #:     3. Suicide Prevention Lifeline: 2-830-605-TALK (5564)    4. [address] [place] [address] Emergency Services -  for example, [facility] suicide hotline, [facility] Hotline:       Emergency Services Address:       Emergency Services Phone:     Making the environment safe:  How can I make my environment (house/apartment/living space) safer? For example, can I remove guns, medications, and other items? 1. Remove all sharps  2.  And maybe wrap me up in a blanket and tell me everything is gonna be okay

## 2020-11-19 ENCOUNTER — TELEPHONE (OUTPATIENT)
Dept: PSYCHIATRY | Age: 20
End: 2020-11-19

## 2022-07-30 ENCOUNTER — HOSPITAL ENCOUNTER (EMERGENCY)
Age: 22
Discharge: HOME | End: 2022-07-30
Payer: MEDICARE

## 2022-07-30 VITALS
TEMPERATURE: 98.6 F | RESPIRATION RATE: 18 BRPM | OXYGEN SATURATION: 100 % | HEART RATE: 97 BPM | SYSTOLIC BLOOD PRESSURE: 134 MMHG | DIASTOLIC BLOOD PRESSURE: 88 MMHG

## 2022-07-30 DIAGNOSIS — F41.1 ANXIETY STATE: Primary | ICD-10-CM

## 2022-07-30 LAB
EKG ATRIAL RATE: 101 BPM
EKG P AXIS: 86 DEGREES
EKG P-R INTERVAL: 144 MS
EKG Q-T INTERVAL: 344 MS
EKG QRS DURATION: 74 MS
EKG QTC CALCULATION (BAZETT): 446 MS
EKG R AXIS: 71 DEGREES
EKG T AXIS: 58 DEGREES
EKG VENTRICULAR RATE: 101 BPM

## 2022-07-30 PROCEDURE — 93010 ELECTROCARDIOGRAM REPORT: CPT | Performed by: INTERNAL MEDICINE

## 2022-07-30 PROCEDURE — 99283 EMERGENCY DEPT VISIT LOW MDM: CPT

## 2022-07-30 PROCEDURE — 93005 ELECTROCARDIOGRAM TRACING: CPT | Performed by: NURSE PRACTITIONER

## 2022-07-30 ASSESSMENT — ENCOUNTER SYMPTOMS
WHEEZING: 0
CONSTIPATION: 0
BLOOD IN STOOL: 0
SHORTNESS OF BREATH: 0
VOMITING: 0
RHINORRHEA: 0
NAUSEA: 0
ABDOMINAL PAIN: 0
DIARRHEA: 0
EYE PAIN: 0
COUGH: 0

## 2022-07-30 NOTE — ED PROVIDER NOTES
[facility] [address] [address] EMERGENCY DEPT  EMERGENCY MEDICINE     Pt Name: Ean Cohen  MRN: [id number]  [place] 2000  Date of evaluation: 7/30/2022  PCP:    KESHA Mena CNP  Provider: KESHA Valladares CNP    CHIEF COMPLAINT       Chief Complaint   Patient presents with    Shortness of Breath    Panic Attack           HISTORY OF PRESENT ILLNESS    Ean Cohen is a 25 y.o. female patient that presents to ER with complaint of chest pain that came on suddenly. Patient states she does have a history of anxiety with panic attacks and she feels like this was possibly a panic attack. She states that all of her symptoms have resolved. She she states she would like to be examined. She denies current chest pain, shortness of breath, nausea, vomiting or fever. Patient states that she does have this problem when she takes hydroxyzine. She states she did not take for that hydroxyzine today but does feel better. Triage notes and Nursing notes were reviewed by myself. Any discrepancies are addressed above. PAST MEDICAL HISTORY     Past Medical History:   Diagnosis Date    Anxiety     Depression     PTSD (post-traumatic stress disorder)        SURGICAL HISTORY       No past surgical history on file.     CURRENT MEDICATIONS       Previous Medications    ARIPIPRAZOLE (ABILIFY) 5 MG TABLET    Take 1 tablet by mouth nightly    BUSPIRONE (BUSPAR) 5 MG TABLET    Take 1 tablet by mouth 3 times daily    FLUOXETINE (PROZAC) 20 MG CAPSULE    Take 1 capsule by mouth daily    TRAZODONE (DESYREL) 50 MG TABLET    Take 1 tablet by mouth nightly as needed for Sleep       ALLERGIES       Allergies   Allergen Reactions    Dill Oil        FAMILY HISTORY       Family History   Problem Relation Age of Onset    Anxiety Disorder Mother     Diabetes Father     Depression Brother         SOCIAL HISTORY       Social History     Socioeconomic History    Marital status: Single    Years of education: 12   Tobacco Use Smoking status: Never    Smokeless tobacco: Never   Vaping Use    Vaping Use: Never used   Substance and Sexual Activity    Alcohol use: No    Drug use: No    Sexual activity: Yes     Partners: Male, Female       REVIEW OF SYSTEMS     Review of Systems   Constitutional:  Negative for appetite change, chills, fatigue, fever and unexpected weight change. HENT:  Negative for ear pain and rhinorrhea. Eyes:  Negative for pain and visual disturbance. Respiratory:  Negative for cough, shortness of breath and wheezing. Cardiovascular:  Negative for chest pain, palpitations and leg swelling. Gastrointestinal:  Negative for abdominal pain, blood in stool, constipation, diarrhea, nausea and vomiting. Genitourinary:  Negative for dysuria, frequency and hematuria. Musculoskeletal:  Negative for arthralgias, joint swelling and neck stiffness. Skin:  Negative for rash. Neurological:  Negative for dizziness, syncope, weakness, light-headedness and headaches. Hematological:  Does not bruise/bleed easily. Psychiatric/Behavioral:  The patient is nervous/anxious. The patient is not hyperactive. Except as noted above the remainder of the review of systems was reviewed and is negative. SCREENINGS                        PHYSICAL EXAM    (up to 7 for level 4, 8 or more for level 5)     ED Triage Vitals [02/19/22 1512]   BP Temp Temp Source Pulse Resp SpO2 Height Weight   (!) 134/95 98.2 °F (36.8 °C) Oral 124 16 100 % -- --       Physical Exam  Vitals and nursing note reviewed. Constitutional:       Appearance: She is well-developed. HENT:      Head: Normocephalic and atraumatic. Eyes:      Conjunctiva/sclera: Conjunctivae normal.      Pupils: Pupils are equal, round, and reactive to light. Cardiovascular:      Rate and Rhythm: Normal rate and regular rhythm. Heart sounds: Normal heart sounds. No murmur heard. No gallop.    Pulmonary:      Effort: Pulmonary effort is normal. No tachypnea or respiratory distress. Breath sounds: Normal breath sounds. No decreased breath sounds, wheezing or rales. Abdominal:      General: Bowel sounds are normal.      Palpations: Abdomen is soft. Musculoskeletal:         General: Normal range of motion. Cervical back: Normal range of motion. Skin:     General: Skin is warm and dry. Neurological:      Mental Status: She is alert and oriented to person, place, and time. DIAGNOSTIC RESULTS     EKG:(none if blank)  All EKGs are interpreted by the Emergency Department Physician who either signs or Co-signs this chart in the absence of a cardiologist.    Sinus tachycardia with a ventricular rate of 101. RADIOLOGY: (none if blank)   I directly visualized the following images and reviewed the radiologist interpretations. Interpretation per the Radiologist below, if available at the time of this note:  No orders to display       LABS:  Labs Reviewed - No data to display    All other labs were within normal range or not returned as of this dictation. Please note, any cultures that may have been sent were not resulted at the time of this patient visit. EMERGENCY DEPARTMENT COURSE and Medical Decision Making:     Vitals:    Vitals:    07/30/22 0103   BP: 134/88   Pulse: 97   Resp: 18   Temp: 98.6 °F (37 °C)   SpO2: 100%       PROCEDURES: (None if blank)  Procedures       MDM  Number of Diagnoses or Management Options  Anxiety state: established, worsening     Amount and/or Complexity of Data Reviewed  Tests in the medicine section of CPT®: ordered and reviewed    Risk of Complications, Morbidity, and/or Mortality  Presenting problems: minimal  Diagnostic procedures: low  Management options: minimal      Differential diagnosis includes but not limited to anxiety state, panic attack or arrhythmia. Patient states that she does take multiple medications for anxiety and feels that this was related to her anxiety.   She states that all of her symptoms have

## 2022-09-19 ENCOUNTER — HOSPITAL ENCOUNTER (EMERGENCY)
Age: 22
Discharge: HOME | End: 2022-09-19
Attending: EMERGENCY MEDICINE
Payer: MEDICARE

## 2022-09-19 VITALS
DIASTOLIC BLOOD PRESSURE: 87 MMHG | RESPIRATION RATE: 17 BRPM | BODY MASS INDEX: 38.28 KG/M2 | SYSTOLIC BLOOD PRESSURE: 133 MMHG | OXYGEN SATURATION: 97 % | HEART RATE: 92 BPM | TEMPERATURE: 98.2 F | WEIGHT: 195 LBS | HEIGHT: 60 IN

## 2022-09-19 DIAGNOSIS — Z71.1 PERSON WITH FEARED HEALTH COMPLAINT IN WHOM NO DIAGNOSIS IS MADE: Primary | ICD-10-CM

## 2022-09-19 PROCEDURE — 99282 EMERGENCY DEPT VISIT SF MDM: CPT

## 2022-09-19 ASSESSMENT — ENCOUNTER SYMPTOMS
RHINORRHEA: 0
VOMITING: 0
SORE THROAT: 0
BACK PAIN: 0
CHEST TIGHTNESS: 0
ABDOMINAL DISTENTION: 0
COUGH: 0
DIARRHEA: 0
WHEEZING: 0
SHORTNESS OF BREATH: 0
CONSTIPATION: 0
NAUSEA: 0
COLOR CHANGE: 0

## 2022-09-19 ASSESSMENT — PAIN - FUNCTIONAL ASSESSMENT: PAIN_FUNCTIONAL_ASSESSMENT: NONE - DENIES PAIN

## 2022-09-19 NOTE — ED PROVIDER NOTES
[address] [address]          Pt Name: Adolph Torres  MRN: [id number]  [place] 2000  Date of Evaluation: 9/19/2022  Treating Resident Physician: Marely Gilliland MD  Supervising Physician: Girish Norton, [address] [place] [address]       Chief Complaint   Patient presents with    Rash     Bilateral hands     History obtained from chart review and the patient. HISTORY OF PRESENT ILLNESS    HPI    Adolph Torres is a 25 y.o. female with a past medical history significant for anxiety, panic attacks , presents to the emergency department for evaluation of substance on her hands. Denies any other symptoms, for the past 2 days she has woken up and her hands have had a substance along. She did not have them when she went to bed, but her hands hand them when she woke up. She is unsure what this is, took her a while yesterday to scrub it off what happened again this morning she came in for evaluation. No change to medications. She states her mood is good, denies suicidal ideation. The patient has no other acute complaints at this time. REVIEW OF SYSTEMS   Review of Systems   Constitutional:  Negative for activity change, appetite change, chills, diaphoresis, fatigue, fever and unexpected weight change. HENT:  Negative for congestion, nosebleeds, rhinorrhea, sneezing and sore throat. Respiratory:  Negative for cough, chest tightness, shortness of breath and wheezing. Cardiovascular:  Negative for chest pain, palpitations and leg swelling. Gastrointestinal:  Negative for abdominal distention, constipation, diarrhea, nausea and vomiting. Genitourinary:  Negative for difficulty urinating, dysuria, frequency, hematuria, urgency, vaginal bleeding and vaginal discharge. Musculoskeletal:  Negative for back pain and neck pain. Skin:  Negative for color change, pallor, rash and wound. Substance on patient's hands. Allergic/Immunologic: Negative for environmental allergies and food allergies. Neurological:  Negative for dizziness, syncope, weakness, numbness and headaches. Hematological:  Negative for adenopathy. Does not bruise/bleed easily. Psychiatric/Behavioral:  Negative for agitation and confusion. All other systems reviewed and are negative. PAST MEDICAL AND SURGICAL HISTORY     Past Medical History:   Diagnosis Date    Anxiety     Depression     PTSD (post-traumatic stress disorder)      History reviewed. No pertinent surgical history. MEDICATIONS   No current facility-administered medications for this encounter. Current Outpatient Medications:     busPIRone (BUSPAR) 5 MG tablet, Take 1 tablet by mouth 3 times daily, Disp: 21 tablet, Rfl: 2    FLUoxetine (PROZAC) 20 MG capsule, Take 1 capsule by mouth daily, Disp: 7 capsule, Rfl: 3    traZODone (DESYREL) 50 MG tablet, Take 1 tablet by mouth nightly as needed for Sleep, Disp: 7 tablet, Rfl: 3    ARIPiprazole (ABILIFY) 5 MG tablet, Take 1 tablet by mouth nightly, Disp: 7 tablet, Rfl: 3      SOCIAL HISTORY     Social History     Social History Narrative    Not on file     Social History     Tobacco Use    Smoking status: Never    Smokeless tobacco: Never   Vaping Use    Vaping Use: Never used   Substance Use Topics    Alcohol use: No    Drug use: No         ALLERGIES     Allergies   Allergen Reactions    Dill Oil          FAMILY HISTORY     Family History   Problem Relation Age of Onset    Anxiety Disorder Mother     Diabetes Father     Depression Brother          PREVIOUS RECORDS   Previous records reviewed:  prior ED visits at [address] [place] [address] and [address] [place] [address] PHYSICAL EXAM     ED Triage Vitals   BP Temp Temp src Pulse Resp SpO2 Height Weight   -- -- -- -- -- -- -- --     Initial vital signs and nursing assessment reviewed and normal. Body mass index is 38.08 kg/m². Pulsoximetry is normal per my interpretation.     Additional Vital Signs:  Vitals:    09/19/22 0654   BP: 133/87   Pulse: 92   Resp: 17   Temp: 98.2 °F (36.8 °C)   SpO2: 97%       Physical Exam  Constitutional:       General: She is not in acute distress. Appearance: Normal appearance. She is normal weight. She is not ill-appearing, toxic-appearing or diaphoretic. HENT:      Head: Normocephalic and atraumatic. Nose: Nose normal.      Mouth/Throat:      Mouth: Mucous membranes are moist.      Pharynx: Oropharynx is clear. Eyes:      Conjunctiva/sclera: Conjunctivae normal.   Cardiovascular:      Rate and Rhythm: Normal rate and regular rhythm. Pulses: Normal pulses. Heart sounds: Normal heart sounds. Pulmonary:      Effort: Pulmonary effort is normal.      Breath sounds: Normal breath sounds. Abdominal:      General: Abdomen is flat. Bowel sounds are normal.      Palpations: Abdomen is soft. Tenderness: There is no abdominal tenderness. Musculoskeletal:      Cervical back: Normal range of motion. Skin:     General: Skin is warm and dry. Capillary Refill: Capillary refill takes less than 2 seconds. Comments: Brown-tan colored substance on both hands and under fingernails   Neurological:      Mental Status: She is alert and oriented to person, place, and time. Psychiatric:         Mood and Affect: Mood normal.         Behavior: Behavior normal.           MEDICAL DECISION MAKING   Initial Differential Diagnosis: (includes but is not limited to)  Fecal material on hands    Plan:   Reassurance    Summary:  Patient appears well, vital stable. The substance on patient's hand was easily removed with an alcohol. She was given a pair of gloves and told to wear those when she went to bed this evening to see if the substance appeared inside or outside the globs.   Also directed follow-up with her primary care team.    ED RESULTS   Laboratory results:  Labs Reviewed - No data to display    Radiologic studies results:  No orders to display ED Medications administered this visit: Medications - No data to display      ED COURSE          Strict return precautions and follow up instructions were discussed with the patient prior to discharge, with which the patient agrees. PROCEDURES   Procedures      MEDICATION CHANGES     Discharge Medication List as of 9/19/2022  7:29 AM            FINAL DISPOSITION     Final diagnoses:   Person with feared health complaint in whom no diagnosis is made       Condition: condition: fair  Dispo: Discharge to home      This transcription was electronically signed. Parts of this transcriptions may have been dictated by use of voice recognition software and electronically transcribed, and parts may have been transcribed with the assistance of an ED scribe. The transcription may contain errors not detected in proofreading. Please refer to my supervising physician's documentation if my documentation differs.     Electronically Signed: Patrick Stanley MD, 09/19/22, 8:52 AM         Vimal Delacruz MD  Resident  09/19/22 7854

## 2022-09-19 NOTE — ED TRIAGE NOTES
Pt presents to ED with c/o bilateral splotches on both hands. Pt states that for the past two mornings, she has woken up with splotches on her hands. Dr. Lorna Eckert at bedside. VSS.

## 2022-09-19 NOTE — ED PROVIDER NOTES
I performed a history and physical examination of the patient and discussed management with the resident. I reviewed the residents note and agree with the documented findings and plan of care. Any areas of disagreement are noted on the chart. I was personally present for the key portions of any procedures. I have documented in the chart those procedures where I was not present during the key portions. I have reviewed the emergency nurses triage note. I agree with the chief complaint, past medical history, past surgical history, allergies, medications, social and family history as documented unless otherwise noted below. Documentation of the HPI, Physical Exam and Medical Decision Making performed by medical students or scribes is based on my personal performance of the HPI, PE and MDM. For Phys Assistant/ Nurse Practitioner cases/documentation I have personally evaluated this patient and have completed at least one if not all key elements of the E/M (history, physical exam, and MDM). My findings are as noted below     Presents for a \"rash\" on her palms of her hand. She states it was on the dorsum of her hand yesterday. Patient states that she is awoken 2 days in a row with this. She does not remember grabbing anything or doing anything. Patient states she initially thought it was blood. And come off with soap and water. She comes in here today complaining of same. Here today on physical examination. Capillary refill is within normal limits. This does not look like blood. It is under her fingernails. Using alcohol swab it was easily removed from her hands this is some sort of stain. I told the patient to be cognizant of what she is grabbing. I gave her a set of gloves for at home to wear to bed at night and see if the stains appeared on the outside of the gloves or if they still appeared on the inside of the hand.   Patient was otherwise resting comfortably on the cot no apparent distress no other physical complaints at this time      No orders to display     Labs Reviewed - No data to display      Final diagnoses:   Person with feared health complaint in whom no diagnosis is made   . I seen this patient with Dr. Afsaneh Brooks, the resident, and agree with his assessment and plan.      Matt Mercer DO  09/19/22 1550

## 2022-12-14 ENCOUNTER — HOSPITAL ENCOUNTER (EMERGENCY)
Age: 22
Discharge: HOME | End: 2022-12-14
Payer: MEDICARE

## 2022-12-14 VITALS
WEIGHT: 187 LBS | HEART RATE: 93 BPM | BODY MASS INDEX: 36.71 KG/M2 | DIASTOLIC BLOOD PRESSURE: 80 MMHG | HEIGHT: 60 IN | TEMPERATURE: 98.1 F | SYSTOLIC BLOOD PRESSURE: 136 MMHG | RESPIRATION RATE: 16 BRPM | OXYGEN SATURATION: 98 %

## 2022-12-14 DIAGNOSIS — J06.9 VIRAL UPPER RESPIRATORY TRACT INFECTION: Primary | ICD-10-CM

## 2022-12-14 DIAGNOSIS — R11.2 NAUSEA AND VOMITING, UNSPECIFIED VOMITING TYPE: ICD-10-CM

## 2022-12-14 LAB
FLU A ANTIGEN: NEGATIVE
GROUP A STREP CULTURE, REFLEX: NEGATIVE
INFLUENZA B AG, EIA: NEGATIVE
REFLEX THROAT C + S: NORMAL

## 2022-12-14 PROCEDURE — 87070 CULTURE OTHR SPECIMN AEROBIC: CPT

## 2022-12-14 PROCEDURE — 99203 OFFICE O/P NEW LOW 30 MIN: CPT | Performed by: NURSE PRACTITIONER

## 2022-12-14 PROCEDURE — 87804 INFLUENZA ASSAY W/OPTIC: CPT

## 2022-12-14 PROCEDURE — 87880 STREP A ASSAY W/OPTIC: CPT

## 2022-12-14 PROCEDURE — 99213 OFFICE O/P EST LOW 20 MIN: CPT

## 2022-12-14 RX ORDER — GUAIFENESIN/DEXTROMETHORPHAN 100-10MG/5
10 SYRUP ORAL 3 TIMES DAILY PRN
Qty: 280 ML | Refills: 0 | Status: SHIPPED | OUTPATIENT
Start: 2022-12-14 | End: 2022-12-24

## 2022-12-14 RX ORDER — ONDANSETRON 4 MG/1
4 TABLET, FILM COATED ORAL EVERY 8 HOURS PRN
Qty: 20 TABLET | Refills: 0 | Status: SHIPPED | OUTPATIENT
Start: 2022-12-14 | End: 2023-01-03

## 2022-12-14 RX ORDER — OMEPRAZOLE 40 MG/1
40 CAPSULE, DELAYED RELEASE ORAL
Qty: 30 CAPSULE | Refills: 0 | Status: SHIPPED | OUTPATIENT
Start: 2022-12-14

## 2022-12-14 RX ORDER — IBUPROFEN 600 MG/1
600 TABLET ORAL 4 TIMES DAILY PRN
Qty: 120 TABLET | Refills: 0 | Status: SHIPPED | OUTPATIENT
Start: 2022-12-14

## 2022-12-14 ASSESSMENT — ENCOUNTER SYMPTOMS
ABDOMINAL PAIN: 0
RHINORRHEA: 0
COUGH: 1
WHEEZING: 0
VOMITING: 1
NAUSEA: 1
CONSTIPATION: 0
DIARRHEA: 0
EYE PAIN: 0
SINUS PAIN: 0
SINUS PRESSURE: 0
BLOOD IN STOOL: 0
SHORTNESS OF BREATH: 0
SORE THROAT: 1

## 2022-12-14 ASSESSMENT — PAIN DESCRIPTION - LOCATION: LOCATION: THROAT

## 2022-12-14 ASSESSMENT — PAIN DESCRIPTION - PAIN TYPE: TYPE: ACUTE PAIN

## 2022-12-14 ASSESSMENT — PAIN - FUNCTIONAL ASSESSMENT: PAIN_FUNCTIONAL_ASSESSMENT: 0-10

## 2022-12-14 ASSESSMENT — PAIN SCALES - GENERAL: PAINLEVEL_OUTOF10: 5

## 2022-12-14 NOTE — Clinical Note
Griselda Hayes was seen and treated in our emergency department on 12/14/2022. She may return to work on 12/16/2022. If you have any questions or concerns, please don't hesitate to call.       Isabel Fernandez, APRN - CNP

## 2022-12-14 NOTE — ED PROVIDER NOTES
[address] Encounter      CHIEFCOMPLAINT       Chief Complaint   Patient presents with    Pharyngitis    Cough    Headache        Nurses Notes reviewed and I agree except as noted in the HPI. HISTORY OF PRESENT ILLNESS   Leeann Fournier is a 25 y.o. female who presents to urgent care with complaint of sore throat, nose, fever, chills, generalized body aches, nausea and vomiting that started 2 days ago. Patient denies taking any medication for her symptoms. Denies chest pain, shortness of breath or diarrhea. REVIEW OF SYSTEMS     Review of Systems   Constitutional:  Positive for chills, fatigue and fever. Negative for appetite change and unexpected weight change. HENT:  Positive for congestion and sore throat. Negative for ear pain, rhinorrhea, sinus pressure and sinus pain. Eyes:  Negative for pain and visual disturbance. Respiratory:  Positive for cough. Negative for shortness of breath and wheezing. Cardiovascular:  Negative for chest pain, palpitations and leg swelling. Gastrointestinal:  Positive for nausea and vomiting. Negative for abdominal pain, blood in stool, constipation and diarrhea. Genitourinary:  Negative for dysuria, frequency and hematuria. Musculoskeletal:  Negative for arthralgias, joint swelling and neck stiffness. Skin:  Negative for rash. Neurological:  Negative for dizziness, syncope, weakness, light-headedness and headaches. Hematological:  Does not bruise/bleed easily. PAST MEDICAL HISTORY         Diagnosis Date    Anxiety     Depression     PTSD (post-traumatic stress disorder)        SURGICAL HISTORY     Patient  has no past surgical history on file.     CURRENT MEDICATIONS       Discharge Medication List as of 12/14/2022  5:09 PM        CONTINUE these medications which have NOT CHANGED    Details   busPIRone (BUSPAR) 5 MG tablet Take 1 tablet by mouth 3 times daily, Disp-21 tablet,R-2Normal      FLUoxetine (PROZAC) 20 MG capsule Take 1 capsule by mouth daily, Disp-7 capsule,R-3Normal      traZODone (DESYREL) 50 MG tablet Take 1 tablet by mouth nightly as needed for Sleep, Disp-7 tablet,R-3Normal      ARIPiprazole (ABILIFY) 5 MG tablet Take 1 tablet by mouth nightly, Disp-7 tablet,R-3Normal             ALLERGIES     Patient is is allergic to dill oil. FAMILY HISTORY     Patient'sfamily history includes Anxiety Disorder in her mother; Depression in her brother; Diabetes in her father. SOCIAL HISTORY     Patient  reports that she has never smoked. She has never used smokeless tobacco. She reports that she does not drink alcohol and does not use drugs. PHYSICAL EXAM     ED TRIAGE VITALS  BP: 136/80, Temp: 98.1 °F (36.7 °C), Heart Rate: 93, Resp: 16, SpO2: 98 %  Physical Exam  Vitals and nursing note reviewed. Constitutional:       General: She is not in acute distress. Appearance: She is well-developed. She is ill-appearing. She is not toxic-appearing or diaphoretic. HENT:      Head: Normocephalic and atraumatic. Mouth/Throat:      Mouth: Mucous membranes are moist.      Pharynx: Oropharyngeal exudate and posterior oropharyngeal erythema present. Tonsils: No tonsillar exudate or tonsillar abscesses. 2+ on the right. 2+ on the left. Eyes:      Conjunctiva/sclera: Conjunctivae normal.      Pupils: Pupils are equal, round, and reactive to light. Cardiovascular:      Rate and Rhythm: Normal rate and regular rhythm. Heart sounds: Normal heart sounds. No murmur heard. No gallop. Pulmonary:      Effort: Pulmonary effort is normal. No respiratory distress. Breath sounds: Normal breath sounds. No stridor. No decreased breath sounds, wheezing, rhonchi or rales. Chest:      Chest wall: No tenderness. Musculoskeletal:         General: Normal range of motion. Cervical back: Normal range of motion and neck supple. Skin:     General: Skin is warm and dry.    Neurological:      Mental Status: She is alert and oriented to person, place, and time. DIAGNOSTIC RESULTS   Labs:  Results for orders placed or performed during the hospital encounter of 12/14/22   Strep A culture, throat   Result Value Ref Range    REFLEX THROAT C + S INDICATED    Rapid influenza A/B antigens   Result Value Ref Range    Flu A Antigen Negative NEGATIVE    Influenza B Ag, EIA Negative NEGATIVE   STREP A ANTIGEN   Result Value Ref Range    GROUP A STREP CULTURE, REFLEX Negative        IMAGING:  No orders to display     URGENT CARE COURSE:        MDM      Patient presents to urgent care with complaint of sore throat, nose, fever, chills, generalized body aches, nausea and vomiting that started 2 days ago. Differential diagnosis include not limited to  strep throat, influenza or viral illness. Rapid strep was negative. This will be sent on for culture. Influenza test is also negative. Patient be discharged home with symptomatic treatment. Patient to follow-up with primary care provider. Patient instructed to go to ER for worsening symptoms, inability to swallow, inability to keep liquids down, inability to urinate for greater than 8 hours or difficulty breathing. Follow-up with your primary care provider. Increase oral intake. Warm salt water gargles after meals and at bedtime to help with sore throat. May take tylenol or ibuprofen as needed for pain or fever. Medications - No data to display  PROCEDURES:    Procedures    FINALIMPRESSION      1. Viral upper respiratory tract infection    2. Nausea and vomiting, unspecified vomiting type        DISPOSITION/PLAN   DISPOSITION Decision To Discharge 12/14/2022 05:09:33 PM    PATIENT REFERRED TO:  [address]  Schedule an appointment as soon as possible for a visit   to establish care  DISCHARGE MEDICATIONS:  Discharge Medication List as of 12/14/2022  5:09 PM        START taking these medications    Details   ondansetron (ZOFRAN) 4 MG tablet Take 1 tablet by mouth every 8 hours as needed for Nausea, Disp-20 tablet, R-0Normal      ibuprofen (ADVIL;MOTRIN) 600 MG tablet Take 1 tablet by mouth 4 times daily as needed for Pain, Disp-120 tablet, R-0Normal      omeprazole (PRILOSEC) 40 MG delayed release capsule Take 1 capsule by mouth every morning (before breakfast), Disp-30 capsule, R-0Normal      guaiFENesin-dextromethorphan (ROBITUSSIN DM) 100-10 MG/5ML syrup Take 10 mLs by mouth 3 times daily as needed for Cough, Disp-280 mL, R-0Normal           Discharge Medication List as of 12/14/2022  5:09 PM          KESHA Ralph CNP, APRN - CNP  12/14/22 8402

## 2022-12-16 LAB — THROAT/NOSE CULTURE: NORMAL

## 2023-02-23 ENCOUNTER — HOSPITAL ENCOUNTER (INPATIENT)
Age: 23
LOS: 4 days | Discharge: HOME | DRG: 751 | End: 2023-02-27
Attending: PSYCHIATRY & NEUROLOGY | Admitting: PSYCHIATRY & NEUROLOGY
Payer: MEDICAID

## 2023-02-23 ENCOUNTER — APPOINTMENT (OUTPATIENT)
Dept: ULTRASOUND IMAGING | Age: 23
DRG: 751 | End: 2023-02-23
Payer: MEDICAID

## 2023-02-23 DIAGNOSIS — R79.89 ELEVATED LFTS: ICD-10-CM

## 2023-02-23 DIAGNOSIS — R45.851 DEPRESSION WITH SUICIDAL IDEATION: Primary | ICD-10-CM

## 2023-02-23 DIAGNOSIS — F32.A DEPRESSION WITH SUICIDAL IDEATION: Primary | ICD-10-CM

## 2023-02-23 PROBLEM — F33.9 DEPRESSION, MAJOR, RECURRENT (HCC): Status: RESOLVED | Noted: 2023-02-23 | Resolved: 2023-02-23

## 2023-02-23 PROBLEM — F33.9 DEPRESSION, MAJOR, RECURRENT (HCC): Status: ACTIVE | Noted: 2023-02-23

## 2023-02-23 LAB
ALBUMIN SERPL BCG-MCNC: 3.9 G/DL (ref 3.5–5.1)
ALBUMIN SERPL BCG-MCNC: 4.2 G/DL (ref 3.5–5.1)
ALP SERPL-CCNC: 104 U/L (ref 38–126)
ALP SERPL-CCNC: 86 U/L (ref 38–126)
ALT SERPL W/O P-5'-P-CCNC: 155 U/L (ref 11–66)
ALT SERPL W/O P-5'-P-CCNC: 200 U/L (ref 11–66)
AMPHETAMINES UR QL SCN: NEGATIVE
ANION GAP SERPL CALC-SCNC: 12 MEQ/L (ref 8–16)
APAP SERPL-MCNC: < 5 UG/ML (ref 0–20)
APTT PPP: 30.1 SECONDS (ref 22–38)
AST SERPL-CCNC: 145 U/L (ref 5–40)
AST SERPL-CCNC: 92 U/L (ref 5–40)
B-HCG SERPL QL: NEGATIVE
BARBITURATES UR QL SCN: NEGATIVE
BASOPHILS ABSOLUTE: 0.1 THOU/MM3 (ref 0–0.1)
BASOPHILS NFR BLD AUTO: 0.5 %
BENZODIAZ UR QL SCN: NEGATIVE
BILIRUB CONJ SERPL-MCNC: < 0.2 MG/DL (ref 0–0.3)
BILIRUB CONJ SERPL-MCNC: < 0.2 MG/DL (ref 0–0.3)
BILIRUB SERPL-MCNC: 0.3 MG/DL (ref 0.3–1.2)
BILIRUB SERPL-MCNC: 0.3 MG/DL (ref 0.3–1.2)
BILIRUB UR QL STRIP.AUTO: NEGATIVE
BUN SERPL-MCNC: 11 MG/DL (ref 7–22)
BZE UR QL SCN: NEGATIVE
CALCIUM SERPL-MCNC: 9.3 MG/DL (ref 8.5–10.5)
CANNABINOIDS UR QL SCN: NEGATIVE
CHARACTER UR: CLEAR
CHLORIDE SERPL-SCNC: 104 MEQ/L (ref 98–111)
CO2 SERPL-SCNC: 26 MEQ/L (ref 23–33)
COLOR: YELLOW
CREAT SERPL-MCNC: 0.6 MG/DL (ref 0.4–1.2)
CRP SERPL-MCNC: 2.1 MG/DL (ref 0–1)
DEPRECATED RDW RBC AUTO: 42.9 FL (ref 35–45)
EOSINOPHIL NFR BLD AUTO: 0.9 %
EOSINOPHILS ABSOLUTE: 0.1 THOU/MM3 (ref 0–0.4)
ERYTHROCYTE [DISTWIDTH] IN BLOOD BY AUTOMATED COUNT: 13.8 % (ref 11.5–14.5)
ETHANOL SERPL-MCNC: < 0.01 %
FENTANYL: NEGATIVE
GFR SERPL CREATININE-BSD FRML MDRD: > 60 ML/MIN/1.73M2
GLUCOSE SERPL-MCNC: 140 MG/DL (ref 70–108)
GLUCOSE UR QL STRIP.AUTO: NEGATIVE MG/DL
HCT VFR BLD AUTO: 40.8 % (ref 37–47)
HGB BLD-MCNC: 12.9 GM/DL (ref 12–16)
HGB UR QL STRIP.AUTO: NEGATIVE
IMM GRANULOCYTES # BLD AUTO: 0.04 THOU/MM3 (ref 0–0.07)
IMM GRANULOCYTES NFR BLD AUTO: 0.3 %
INR PPP: 1 (ref 0.85–1.13)
KETONES UR QL STRIP.AUTO: NEGATIVE
LYMPHOCYTES ABSOLUTE: 3.5 THOU/MM3 (ref 1–4.8)
LYMPHOCYTES NFR BLD AUTO: 26.2 %
MAGNESIUM SERPL-MCNC: 1.8 MG/DL (ref 1.6–2.4)
MCH RBC QN AUTO: 27 PG (ref 26–33)
MCHC RBC AUTO-ENTMCNC: 31.6 GM/DL (ref 32.2–35.5)
MCV RBC AUTO: 85.4 FL (ref 81–99)
MONOCYTES ABSOLUTE: 0.7 THOU/MM3 (ref 0.4–1.3)
MONOCYTES NFR BLD AUTO: 5.1 %
NEUTROPHILS NFR BLD AUTO: 67 %
NITRITE UR QL STRIP: NEGATIVE
NRBC BLD AUTO-RTO: 0 /100 WBC
OPIATES UR QL SCN: NEGATIVE
OSMOLALITY SERPL CALC.SUM OF ELEC: 284.8 MOSMOL/KG (ref 275–300)
OXYCODONE: NEGATIVE
PCP UR QL SCN: NEGATIVE
PH UR STRIP.AUTO: 6 [PH] (ref 5–9)
PHOSPHATE SERPL-MCNC: 4.1 MG/DL (ref 2.4–4.7)
PLATELET # BLD AUTO: 371 THOU/MM3 (ref 130–400)
PMV BLD AUTO: 10.1 FL (ref 9.4–12.4)
POTASSIUM SERPL-SCNC: 3.5 MEQ/L (ref 3.5–5.2)
PROT SERPL-MCNC: 6.8 G/DL (ref 6.1–8)
PROT SERPL-MCNC: 7.7 G/DL (ref 6.1–8)
PROT UR STRIP.AUTO-MCNC: NEGATIVE MG/DL
RBC # BLD AUTO: 4.78 MILL/MM3 (ref 4.2–5.4)
SALICYLATES SERPL-MCNC: < 0.3 MG/DL (ref 2–10)
SEGMENTED NEUTROPHILS ABSOLUTE COUNT: 8.9 THOU/MM3 (ref 1.8–7.7)
SODIUM SERPL-SCNC: 142 MEQ/L (ref 135–145)
SP GR UR REFRACT.AUTO: 1.03 (ref 1–1.03)
TSH SERPL DL<=0.005 MIU/L-ACNC: 3.37 UIU/ML (ref 0.4–4.2)
TSH SERPL DL<=0.005 MIU/L-ACNC: 6.5 UIU/ML (ref 0.4–4.2)
UROBILINOGEN, URINE: 1 EU/DL (ref 0–1)
WBC # BLD AUTO: 13.3 THOU/MM3 (ref 4.8–10.8)
WBC #/AREA URNS HPF: NEGATIVE /[HPF]

## 2023-02-23 PROCEDURE — 80179 DRUG ASSAY SALICYLATE: CPT

## 2023-02-23 PROCEDURE — 84443 ASSAY THYROID STIM HORMONE: CPT

## 2023-02-23 PROCEDURE — 82248 BILIRUBIN DIRECT: CPT

## 2023-02-23 PROCEDURE — 6370000000 HC RX 637 (ALT 250 FOR IP): Performed by: PHYSICIAN ASSISTANT

## 2023-02-23 PROCEDURE — 84100 ASSAY OF PHOSPHORUS: CPT

## 2023-02-23 PROCEDURE — 80143 DRUG ASSAY ACETAMINOPHEN: CPT

## 2023-02-23 PROCEDURE — 85025 COMPLETE CBC W/AUTO DIFF WBC: CPT

## 2023-02-23 PROCEDURE — 80307 DRUG TEST PRSMV CHEM ANLYZR: CPT

## 2023-02-23 PROCEDURE — 1240000000 HC EMOTIONAL WELLNESS R&B

## 2023-02-23 PROCEDURE — 99285 EMERGENCY DEPT VISIT HI MDM: CPT

## 2023-02-23 PROCEDURE — 84703 CHORIONIC GONADOTROPIN ASSAY: CPT

## 2023-02-23 PROCEDURE — 81003 URINALYSIS AUTO W/O SCOPE: CPT

## 2023-02-23 PROCEDURE — 80074 ACUTE HEPATITIS PANEL: CPT

## 2023-02-23 PROCEDURE — 80053 COMPREHEN METABOLIC PANEL: CPT

## 2023-02-23 PROCEDURE — 83735 ASSAY OF MAGNESIUM: CPT

## 2023-02-23 PROCEDURE — 76705 ECHO EXAM OF ABDOMEN: CPT

## 2023-02-23 PROCEDURE — 36415 COLL VENOUS BLD VENIPUNCTURE: CPT

## 2023-02-23 PROCEDURE — 85610 PROTHROMBIN TIME: CPT

## 2023-02-23 PROCEDURE — APPSS30 APP SPLIT SHARED TIME 16-30 MINUTES: Performed by: PHYSICIAN ASSISTANT

## 2023-02-23 PROCEDURE — 85730 THROMBOPLASTIN TIME PARTIAL: CPT

## 2023-02-23 PROCEDURE — 82077 ASSAY SPEC XCP UR&BREATH IA: CPT

## 2023-02-23 PROCEDURE — 86140 C-REACTIVE PROTEIN: CPT

## 2023-02-23 RX ORDER — IBUPROFEN 400 MG/1
400 TABLET ORAL EVERY 6 HOURS PRN
Status: DISCONTINUED | OUTPATIENT
Start: 2023-02-23 | End: 2023-02-27 | Stop reason: HOSPADM

## 2023-02-23 RX ORDER — HYDROXYZINE HYDROCHLORIDE 25 MG/1
50 TABLET, FILM COATED ORAL 3 TIMES DAILY PRN
Status: DISCONTINUED | OUTPATIENT
Start: 2023-02-23 | End: 2023-02-27 | Stop reason: HOSPADM

## 2023-02-23 RX ORDER — TRAZODONE HYDROCHLORIDE 50 MG/1
50 TABLET ORAL NIGHTLY PRN
Status: DISCONTINUED | OUTPATIENT
Start: 2023-02-23 | End: 2023-02-27 | Stop reason: HOSPADM

## 2023-02-23 RX ORDER — ACETAMINOPHEN 325 MG/1
650 TABLET ORAL EVERY 4 HOURS PRN
Status: DISCONTINUED | OUTPATIENT
Start: 2023-02-23 | End: 2023-02-27 | Stop reason: HOSPADM

## 2023-02-23 RX ORDER — ARIPIPRAZOLE 10 MG/1
10 TABLET ORAL NIGHTLY
Status: DISCONTINUED | OUTPATIENT
Start: 2023-02-23 | End: 2023-02-27 | Stop reason: HOSPADM

## 2023-02-23 RX ORDER — MAGNESIUM HYDROXIDE/ALUMINUM HYDROXICE/SIMETHICONE 120; 1200; 1200 MG/30ML; MG/30ML; MG/30ML
30 SUSPENSION ORAL EVERY 6 HOURS PRN
Status: DISCONTINUED | OUTPATIENT
Start: 2023-02-23 | End: 2023-02-27 | Stop reason: HOSPADM

## 2023-02-23 RX ORDER — FLUOXETINE HYDROCHLORIDE 20 MG/1
60 CAPSULE ORAL DAILY
Status: DISCONTINUED | OUTPATIENT
Start: 2023-02-23 | End: 2023-02-27 | Stop reason: HOSPADM

## 2023-02-23 RX ORDER — BUSPIRONE HYDROCHLORIDE 10 MG/1
10 TABLET ORAL 2 TIMES DAILY
Status: DISCONTINUED | OUTPATIENT
Start: 2023-02-23 | End: 2023-02-24

## 2023-02-23 RX ADMIN — FLUOXETINE 60 MG: 20 CAPSULE ORAL at 10:20

## 2023-02-23 RX ADMIN — BUSPIRONE HYDROCHLORIDE 10 MG: 10 TABLET ORAL at 10:20

## 2023-02-23 ASSESSMENT — LIFESTYLE VARIABLES
HOW OFTEN DO YOU HAVE A DRINK CONTAINING ALCOHOL: NEVER
HOW MANY STANDARD DRINKS CONTAINING ALCOHOL DO YOU HAVE ON A TYPICAL DAY: PATIENT DOES NOT DRINK
HOW OFTEN DO YOU HAVE A DRINK CONTAINING ALCOHOL: NEVER
HOW MANY STANDARD DRINKS CONTAINING ALCOHOL DO YOU HAVE ON A TYPICAL DAY: PATIENT DOES NOT DRINK

## 2023-02-23 ASSESSMENT — PATIENT HEALTH QUESTIONNAIRE - PHQ9: SUM OF ALL RESPONSES TO PHQ QUESTIONS 1-9: 12

## 2023-02-23 ASSESSMENT — SLEEP AND FATIGUE QUESTIONNAIRES
DO YOU HAVE DIFFICULTY SLEEPING: NO
DO YOU HAVE DIFFICULTY SLEEPING: NO
DO YOU USE A SLEEP AID: YES
AVERAGE NUMBER OF SLEEP HOURS: 7
SLEEP PATTERN: NORMAL
DO YOU USE A SLEEP AID: YES
SLEEP PATTERN: NORMAL
AVERAGE NUMBER OF SLEEP HOURS: 7

## 2023-02-23 ASSESSMENT — PAIN - FUNCTIONAL ASSESSMENT: PAIN_FUNCTIONAL_ASSESSMENT: NONE - DENIES PAIN

## 2023-02-23 NOTE — PROGRESS NOTES
Behavioral Services  Medicare Certification Upon Admission    I certify that this patient's inpatient psychiatric hospital admission is medically necessary for:    [x] (1) Treatment which could reasonably be expected to improve this patient's condition,       [x] (2) Or for diagnostic study;     AND     [x](2) The inpatient psychiatric services are provided while the individual is under the care of a physician and are included in the individualized plan of care.     Estimated length of stay/service 3-5 days    Plan for post-hospital care hc    Electronically signed by Michelle Guajardo MD on 2/23/2023 at 8:53 AM

## 2023-02-23 NOTE — PLAN OF CARE
Patient has not attended any of the groups and has not been out of her room for social interaction so she has not demonstrated effective coping strategies at this time. Patient will be encouraged to attend all groups and to come out of her room for socialization with others daily.      Problem: Coping  Goal: Pt/Family able to verbalize concerns and demonstrate effective coping strategies  Outcome: Not Progressing

## 2023-02-23 NOTE — ED NOTES
Pt resting on cot with eyes closed, respirations are equal and unlabored.  Pt denies needs at this time, will continue to monitor, call light in reach, pt remains under continuous supervision in the safe room      SHALOM HIGGINS, [address] [place] [address]  02/23/23 2219

## 2023-02-23 NOTE — ED NOTES
Patient placed in safe room that is ligature resistant with continuous monitoring in place. Provider notified, requested an assessment by behavioral health . Patient belongings secured in a locked lockers outside of the room. Explained suicide prevention precautions to the patient including constant observer.         [facility], [facility]  02/23/23 3271

## 2023-02-23 NOTE — ED NOTES
ED to inpatient nurses report    Chief Complaint   Patient presents with    Suicidal      Present to ED from home  LOC: alert and orientated to name, place, date  Vital signs   Vitals:    02/23/23 0131   BP: 128/78   Pulse: (!) 102   Resp: 16   Temp: 98.7 °F (37.1 °C)   TempSrc: Oral   SpO2: 98%   Weight: 180 lb (81.6 kg)   Height: 5' (1.524 m)      Oxygen Baseline room air    Current needs required none   LDAs:    Mobility: Independent  Pending ED orders: none  Present condition: stable    Our promise was given to patient    C-SSRS Risk of Suicide: High Risk  Swallow Screening    Preferred Language: [place]     Electronically signed by Janny Cho RN on 2/23/2023 at 5:09 AM       Janny Cho RN  02/23/23 0510
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
## 2023-02-23 NOTE — ED NOTES
Pt presents to the ED c/o suicidal ideations. Pt state that she has been having suicidal thoughts for the last few days due to some family matters that occurred at home. Pt was not willing to share the situation with this RN at this time. Pt reports that she has been self harming at home but cutting her arms. Pt reports that it has been a few days since she has cut herself. Pt has evidence of several abrasions that are scabbed over on the left inner forearm. Pt reports that she plans to hang herself from her bathroom door in order to end her life. Pt is alert and oriented, respirations are equal and unlabored. Pt placed in safe rooms under suicide precautions.       [facility]  02/23/23 1260

## 2023-02-23 NOTE — BH NOTE
INPATIENT RECREATIONAL THERAPY  ADULT BEHAVIORAL SERVICES  EVALUATION    REFERRING PHYSICIAN:   Dr. Makayla Green  DIAGNOSIS:    Depression, Major, Recurrent  PRECAUTIONS:   standard precautions    PMH:  Please see medical chart for prior medical history, allergies, and medication    HISTORY OF PRESENT ILLNESS: Patient was admitted to the unit due to suicidal ideation and depression. Patient reported having a plan to hang herself prior to admission. Patient reported relationship stress with her siblings. Patient is a Rexie Shine client and stated that she has been compliant with her medications. Patient likes to be called \"Yuliana. \"    YOB: 2000  GENDER:  female  MARITAL STATUS:  single  EMPLOYMENT STATUS:   unemployed    LIVING SITUATION/SUPPORT:   Lives alone. EDUCATIONAL LEVEL:   graduate  MEDICATION/DRUG USE:  Compliant with medications. Overdose in the past.     LEISURE INTERESTS:   drawing, sketching, coloring and other arts/crafts, Animae, attending music concerts, family activities, activities with friends, listening to music, spiritual activities (Patient is a [place]), watching TV/Movies  ACTIVITY PREFERENCE:   small group  ACTIVITY TYPES:   Passive. Indoor. Outdoor. Active. COGNITION:    A&Ox4    COPING:   poor  ATTENTION:  fair  RELAXATION:  Patient appeared anxious. SELF-ESTEEM:   poor  MOTIVATION:   poor    SOCIAL SKILLS:   fair  FRUSTRATION TOLERANCE:   Poor -history of cutting  ATTENTION SEEKING:  Patient has a history of cutting. COOPERATION:   cooperative and pleasant  AFFECT:   bright  APPEARANCE:  appropriiate    HEARING:   no problems noted  VISION:  no problems noted  VERBAL COMMUNICATION:   no problems   WRITTEN COMMUNICATION:   no problems    COORDINATION:   no problems noted  MOBILITY:  Ambulates independently  GOALS:   Identify 2 new positive coping skills by time of discharge.

## 2023-02-23 NOTE — PLAN OF CARE
Problem: Self Harm/Suicidality  Goal: Will have no self-injury during hospital stay  Description: INTERVENTIONS:  1. Ensure constant observer at bedside with Q15M safety checks  2. Maintain a safe environment  3. Secure patient belongings  4. Ensure family/visitors adhere to safety recommendations  5. Ensure safety tray has been added to patient's diet order  6. Every shift and PRN: Re-assess suicidal risk via Frequent Screener    Outcome: Progressing  Flowsheets (Taken 2/23/2023 0608 by Shantelle Pulliam RN)  Will have no self-injury during hospital stay: Maintain a safe environment  Note: Patient denies self harm at this time. 15 minute safety checks in place. Problem: Depression  Goal: Will be euthymic at discharge  Description: INTERVENTIONS:  1. Administer medication as ordered  2. Provide emotional support via 1:1 interaction with staff  3. Encourage involvement in milieu/groups/activities  4. Monitor for social isolation  Outcome: Progressing  Note: Ongoing, Patient currently rates mood as a 5/10 with 10 being the best mood. Problem: Discharge Planning  Goal: Discharge to home or other facility with appropriate resources  Outcome: Progressing  Flowsheets (Taken 2/23/2023 0608 by Shantelle Pulliam, GLORIA)  Discharge to home or other facility with appropriate resources: Identify barriers to discharge with patient and caregiver  Note: Discharge planning ongoing at this time. Patient plans on following up with Aleena Yu. Care plan reviewed with patient.   Patient does verbalize understanding of the plan of care and does contribute to goal setting

## 2023-02-23 NOTE — ED PROVIDER NOTES
[address]      EMERGENCY MEDICINE     Pt Name: Alen Gaffney  MRN: [id number]  Alexystrongfmary 2000  Date of evaluation: 2/23/2023  Provider: KESHA Short CNP    CHIEF COMPLAINT       Chief Complaint   Patient presents with    Suicidal     HISTORY OF PRESENT ILLNESS   Leeann Mirza is a pleasant 25 y.o. female who presents to the emergency department from home with c/o pression with suicidal ideation with a plan to hang herself. Patient cites discord with her siblings as the inciting event. States she has been taking her medicines      History is obtained from:  patient  PASTMEDICAL HISTORY     Past Medical History:   Diagnosis Date    Anxiety     Depression     PTSD (post-traumatic stress disorder)        Patient Active Problem List   Diagnosis Code    Severe recurrent major depression without psychotic features ([address] F33.2    NARINDER (generalized anxiety disorder) F41.1    PTSD (post-traumatic stress disorder) F43.10    Suicidal ideation R45.851    Severe episode of recurrent major depressive disorder, without psychotic features ([address] F33.2    Depression, major, recurrent ([address] F33.9     SURGICAL HISTORY     History reviewed. No pertinent surgical history. CURRENT MEDICATIONS       Current Discharge Medication List        CONTINUE these medications which have NOT CHANGED    Details   busPIRone (BUSPAR) 5 MG tablet Take 1 tablet by mouth 3 times daily  Qty: 21 tablet, Refills: 2      FLUoxetine (PROZAC) 20 MG capsule Take 1 capsule by mouth daily  Qty: 7 capsule, Refills: 3      traZODone (DESYREL) 50 MG tablet Take 1 tablet by mouth nightly as needed for Sleep  Qty: 7 tablet, Refills: 3      ARIPiprazole (ABILIFY) 5 MG tablet Take 1 tablet by mouth nightly  Qty: 7 tablet, Refills: 3             ALLERGIES     is allergic to dill oil. FAMILY HISTORY     She indicated that her mother is alive. She indicated that her father is alive. She indicated that her sister is alive.  She indicated that her brother is alive. SOCIAL HISTORY       Social History     Tobacco Use    Smoking status: Never    Smokeless tobacco: Never   Vaping Use    Vaping Use: Never used   Substance Use Topics    Alcohol use: No    Drug use: No       PHYSICAL EXAM       ED Triage Vitals [02/23/23 0131]   BP Temp Temp Source Heart Rate Resp SpO2 Height Weight   128/78 98.7 °F (37.1 °C) Oral (!) 102 16 98 % 5' (1.524 m) 180 lb (81.6 kg)       Physical Exam  Constitutional:       General: She is not in acute distress. Appearance: She is well-developed. She is not diaphoretic. HENT:      Head: Normocephalic and atraumatic. Nose: Nose normal.      Mouth/Throat:      Mouth: Mucous membranes are moist.      Pharynx: Oropharynx is clear. Eyes:      Conjunctiva/sclera: Conjunctivae normal.   Cardiovascular:      Pulses: Normal pulses. Pulmonary:      Effort: Pulmonary effort is normal.   Musculoskeletal:         General: No deformity. Normal range of motion. Cervical back: Normal range of motion. Skin:     General: Skin is warm and dry. Capillary Refill: Capillary refill takes less than 2 seconds. Neurological:      General: No focal deficit present. Mental Status: She is alert and oriented to person, place, and time. Psychiatric:         Mood and Affect: Mood is depressed. Affect is flat. Behavior: Behavior normal.         Thought Content: Thought content includes suicidal ideation. Thought content includes suicidal plan. FORMAL DIAGNOSTIC RESULTS     RADIOLOGY: Interpretation per the Radiologist below, if available at the time of this note (none if blank):     No orders to display       LABS: (none if blank)  Labs Reviewed   BASIC METABOLIC PANEL - Abnormal; Notable for the following components:       Result Value    Glucose 140 (*)     All other components within normal limits   CBC WITH AUTO DIFFERENTIAL - Abnormal; Notable for the following components:    WBC 13.3 (*) MCHC 31.6 (*)     Segs Absolute 8.9 (*)     All other components within normal limits   HEPATIC FUNCTION PANEL - Abnormal; Notable for the following components:     (*)      (*)     All other components within normal limits   SALICYLATE LEVEL - Abnormal; Notable for the following components:    Salicylate, Serum < 0.3 (*)     All other components within normal limits   TSH - Abnormal; Notable for the following components:    TSH 6.500 (*)     All other components within normal limits   ACETAMINOPHEN LEVEL   ETHANOL   HCG, SERUM, QUALITATIVE   URINE DRUG SCREEN   URINALYSIS WITH REFLEX TO CULTURE   ANION GAP   GLOMERULAR FILTRATION RATE, ESTIMATED   OSMOLALITY       (Any cultures that may have been sent were not resulted at the time of this patient visit)    [facility] / ED COURSE:     Summary of Patient Presentation:      1) Number and Complexity of Problems            Problem List This Visit:         Chief Complaint   Patient presents with    Suicidal             Differential Diagnosis includes (but not limited to):  Depression, suicidal ideation             2)  Data Reviewed (none if left blank, additional information can be found in the ED course)          My Independent interpretations:     EKG:           Imaging:      Labs:       Medically clear                 Decision Rules/Clinical Scores utilized:                            External Documentation Reviewed:         Previous patient encounter documents & history available on EMR was reviewed              See Formal Diagnostic Results above for the lab and radiology tests and orders.          3)  Treatment and Disposition         ED Reassessment: Stable         Case discussed with consulting clinician/attending physician:   [facility]FFIL[facility]ATE OF [facility]         Shared Decision-Making was performed and disposition discussed with the       Patient/Family and questions answered          Social determinants of health impacting treatment or disposition: Code Status:  Full        [facility]    Vitals Reviewed:    Vitals:    02/23/23 0131 02/23/23 0533   BP: 128/78 121/86   Pulse: (!) 102 97   Resp: 16 18   Temp: 98.7 °F (37.1 °C) 97.4 °F (36.3 °C)   TempSrc: Oral Tympanic   SpO2: 98% 98%   Weight: 180 lb (81.6 kg) 197 lb (89.4 kg)   Height: 5' (1.524 m) 5' (1.524 m)       The patient was seen and evaluated within the ED today for the evaluation of suicidal ideation. Physical exam revealed no significant abnormalities or concerns. I completed a medical evaluation of the patient and ordered appropriate labs which were unremarkable. MIGUEL and social work completed a full psychiatric evaluation of the patient and determined that he met  admission to the inpatient psychiatric unit criteria. I medically cleared the patient. MIGUEL and social work's noted should be consulted for the psychiatric evaluation and reason for admission to the inpatient psychiatric unit. The results of pertinent diagnostic studies and exam findings were discussed. The patients provisional diagnosis and plan of care were discussed with the patient and present family who expressed understanding and agreement with the POC. Any medications were reviewed and indications and risks of medications were discussed with the patient /family present. Strict verbal and written return precautions, instructions and appropriate follow-up provided to  the patient. Patient was DISCHARGED from the hospital. Based on the reassuring ED workup and patient's stable vital signs, I feel the patient may be safely discharged home. At this point in time, I believe the patient has the mental capacity to make medical decisions. No notes of EC Admission Criteria type on file. Please note that the patient was evaluated during a pandemic. All efforts were made for HIPPA compliance as well as provision of appropriate care. Patient was seen independently by myself.  The patient's final impression and disposition and plan was determined by myself. Strict return precautions and follow up instructions were discussed with the patient prior to discharge, with which the patient agrees. Physical assessment findings, diagnostic testing(s) if applicable, and vital signs reviewed with patient/patient representative. Questions answered. Medications asdirected, including OTC medications for supportive care. Education provided on medications. Differential diagnosis(s) discussed with patient/patient representative. Home care/self care instructions reviewed withpatient/patient representative. Patient is to follow-up with family care provider in 2-3 days if no improvement. Patient is to go to the emergency department if symptoms worsen. Patient/patient representative isaware of care plan, questions answered, verbalizes understanding and is in agreement. ED Medications administered this visit:  (None if blank)  Medications   acetaminophen (TYLENOL) tablet 650 mg (has no administration in time range)   ibuprofen (ADVIL;MOTRIN) tablet 400 mg (has no administration in time range)   magnesium hydroxide (MILK OF MAGNESIA) 400 MG/5ML suspension 30 mL (has no administration in time range)   aluminum & magnesium hydroxide-simethicone (MAALOX) 200-200-20 MG/5ML suspension 30 mL (has no administration in time range)   hydrOXYzine HCl (ATARAX) tablet 50 mg (has no administration in time range)   traZODone (DESYREL) tablet 50 mg (has no administration in time range)         CONSULTS:  MIGUEL    PROCEDURES: (None if blank)  Procedures:     CRITICAL CARE: (None if blank)      DISCHARGE PRESCRIPTIONS: (None if blank)  Current Discharge Medication List          FINAL IMPRESSION      1. Depression with suicidal ideation          DISPOSITION/PLAN   DISPOSITION Admitted 02/23/2023 05:01:41 AM      OUTPATIENT FOLLOW UP THE PATIENT:  No follow-up provider specified.     KESHA Wynn CNP, APRN - CNP  02/23/23 0308

## 2023-02-23 NOTE — ED NOTES
Pt resting on cot with eyes closed, respirations are equal and unlabored.  Pt denies needs at this time, call light in reach, pt remains under continuous supervision in the safe rooms     [facility]  02/23/23 0547

## 2023-02-23 NOTE — PATIENT CARE CONFERENCE
[facility]  Initial Interdisciplinary Treatment Plan NOTE    REVIEW DATE AND TIME: 2/23/2023 0946    PATIENT was IN TREATMENT TEAM.  See Multidisciplinary Treatment Team sheet for participants. ADMISSION TYPE:   Admission Type: Voluntary    REASON FOR ADMISSION:  Reason for Admission: Depression with suicidal thoughts      Estimated Length of Stay Update:  3-5 days  Estimated Discharge Date Update: 3-5 days    Patient Strengths/Barriers  Strengths (Must Choose Two): Independent living, Motivation level for treatment, Support from family, Support from friends  Barriers: Other (comment) (NA)  Addictive Behavior:Addictive Behavior  In the Past 3 Months, Have You Felt or Has Someone Told You That You Have a Problem With  : None  Medical Problems:  Past Medical History:   Diagnosis Date    Anxiety     Depression     PTSD (post-traumatic stress disorder)        EDUCATION:   Learner Progress Toward Treatment Goals: Reviewed results and recommendations of this team, Reviewed group plan and strategies, and Reviewed goals and plan of care    Method: Individual    Outcome: Verbalized understanding and Needs reinforcement    PATIENT GOALS: Medication adjustments    OQ TOP QUALITY PRIORITIES FOR THE PATIENT AS IDENTIFIED ON ADMISSION ADMINISTRATION:        NA    PLAN/TREATMENT RECOMMENDATIONS UPDATE:   What is the most important thing we can help you with while you are here? Medication adjustments  Who is your support system? Dad and step-mom  Do you have follow-up providers? Lacretia Girt you have the ability to pay for your medications? [place] Medicaid  Where will you be residing when you leave the hospital?   Alone in [facility]  Will need a return to work slip or FMLA paper completion?    N/A      GOALS UPDATE:   Time frame for Short-Term Goals: Daily    Kamini Cameron MSW, LSW

## 2023-02-23 NOTE — CONSULTS
Hospitalist H&P    Patient:  Chaka Haddad    MRN: [id number]    Unit/Bed:4E-56/056-B    Acct: [de-identified]    YOB: 2000    PCP: Unknown Unknown    Date of Admission: 2/23/2023      Assessment and Plan:     1. Transaminitis, unspecified. Minimal, with  and AST of 92. Unclear cause but differential includes fluoxetine, BuSpar, acute viral hepatitis, and nonalcoholic fatty liver disease. Acute hepatitis panel and ultrasound of liver and spleen pending. Monitor with daily liver panel. Hospitalist team will follow to manage transaminitis. If resolution, hospitalist team may sign off.    2.  Elevated TSH, unspecified. Initial TSH of 6.500 but repeat TSH of 3.370 on the same day. Unclear if this elevated TSH is \"real.\"  Repeat TSH with reflex free T4 in the a.m. and then reevaluate. 3.  Chronic Leukocytosis, unspecified. Elevated white blood cell count seems to go back to at least 6/20/2018. No infectious signs or symptoms. C-reactive protein and sedimentation rate to evaluate for possible underlying inflammatory cause. Monitor with daily CBC and discontinue this daily lab if leukocytosis resolves or remains stable. 4.  Depression, Anxiety, PTSD, and Suicidal Ideation. Maintain on Abilify 10 mg nightly, fluoxetine 60 mg daily, and BuSpar 10 mg twice daily and follow-up with psychiatry as an inpatient and as an outpatient. Trazodone 50 mg nightly as needed for sleep. Maintain on Atarax 50 mg 3 times daily for anxiety. 5.  Morbid Obesity. With a BMI of 38.47 kg/m². Follow-up with PCP as an outpatient. CC:  Suicidal Ideation; hospitalist service consulted for medical management    Opening Statement:  The patient is a 25year old female with a past medical history of morbid obesity, depression, anxiety, and PTSD who was admitted for suicidal ideation and who the hospitalist service is seeing for medical management of elevated TSH, leukocytosis, and transaminitis. HPI/Summary Hospital Course:   Patient affirms that she came to [facility] on the morning of 2/23/2023 for suicidal thoughts with plan to hang herself for the last week. Attempted to commit suicide by hanging 4-5 months ago. Patient denies any other specific complaints at this time. Denies any current fevers, chills, headache, dizziness, chest pain, palpitations, cough, shortness of breath, abdominal pain, nausea, vomiting, constipation, dysuria, and hematuria. Affirms diarrhea for the past several days which she describes as a single watery, large volume bowel movement per day. Affirms medical history described above. States that she takes her medications consistently. Denies any significant medication reactions. Dill oil listed in the chart as allergy. Denies any other recent illnesses, injuries, hospitalizations, or falls except as above. However, admitted to [facility] psychiatry on July and November 2020. Admitted to [facility] about a year ago. Denies any history of tobacco use, alcohol use, or illicit drug use. Electrolyte panel unremarkable except for slightly elevated glucose of 140. Liver panel notable for an ALT of 155, and AST of 92, and no other significant abnormalities. Pregnancy test negative. Initial TSH of 6.500 and repeat TSH of 3.370. Initial ethyl alcohol level negative. Urine drug screen negative. Urine cannabinoid and PCP testing negative. Acetaminophen level negative. Oxycodone negative. Salicylate level negative. Fentanyl negative. CBC notable for normal leukocytosis which appears to be baseline. Otherwise, normal hemoglobin and normal platelet count. INR and APTT within the normal range. Urinalysis completely unremarkable. ROS (Review of systems completed. Pertinent positives noted.  Otherwise ROS is negative)    PMH:  Per HPI and       Diagnosis Date    Anxiety     Depression     PTSD (post-traumatic stress disorder)      SHX:  History reviewed. No pertinent surgical history. FHX:       Problem Relation Age of Onset    Anxiety Disorder Mother     Diabetes Father     Depression Brother      Allergies: Dill oil  Medications:       FLUoxetine  60 mg Oral Daily    busPIRone  10 mg Oral BID    ARIPiprazole  10 mg Oral Nightly       Vital Signs:   /62   Pulse 75   Temp 97.7 °F (36.5 °C) (Tympanic)   Resp 16   Ht 5' (1.524 m)   Wt 197 lb (89.4 kg)   SpO2 99%   BMI 38.47 kg/m²    No intake or output data in the 24 hours ending 02/23/23 1955     Physical Exam  Constitutional:       General: She is not in acute distress. Appearance: Normal appearance. She is obese. She is not toxic-appearing. Comments: The patient is awake, alert, and in no acute distress. Capable of answering questions and following commands. Scars from self-harm noted over the left upper extremity. HENT:      Head: Normocephalic and atraumatic. Right Ear: External ear normal.      Left Ear: External ear normal.      Mouth/Throat:      Mouth: Mucous membranes are moist.      Pharynx: Oropharynx is clear. Eyes:      General: No scleral icterus. Pupils: Pupils are equal, round, and reactive to light. Cardiovascular:      Rate and Rhythm: Normal rate and regular rhythm. Pulses: Normal pulses. Heart sounds: Normal heart sounds. Pulmonary:      Effort: Pulmonary effort is normal. No respiratory distress. Breath sounds: Normal breath sounds. No wheezing or rales. Abdominal:      General: Abdomen is flat. There is no distension. Palpations: Abdomen is soft. Tenderness: There is no abdominal tenderness. There is no guarding. Musculoskeletal:         General: Normal range of motion. Right lower leg: No edema. Left lower leg: No edema. Skin:     General: Skin is warm and dry. Capillary Refill: Capillary refill takes less than 2 seconds.    Neurological:      Mental Status: She is alert. GCS: GCS eye subscore is 4. GCS verbal subscore is 5. GCS motor subscore is 6. Psychiatric:         Mood and Affect: Mood normal.         Behavior: Behavior normal.         Thought Content: Thought content normal.         Judgment: Judgment normal.        Data: (All radiographs, tracings, PFTs, and imaging are personally viewed and interpreted unless otherwise noted). Pertinent findings as noted above.        Electronically signed by Laith Montoya MD, MPH, [facility] on 2/23/2023 at 7:55 PM   Supervised by Dr. Barbara Fernandes

## 2023-02-23 NOTE — PROGRESS NOTES
Chief Complaint:   Suicidal        Provisional Diagnosis:  Major Depressive Disorder Severe Recurrent without Psychotic Features      Risk, Psychosocial and Contextual Factors: (homeless, lack of social support etc.): Age, limited social support. Current  Treatment: [facility]        Present Suicidal Behavior:    Verbal: xxx    Attempt: Denies      Access to Weapons: Denies      C-SSRS Current Suicide Risk: Low, Moderate or High:   High      Past Suicidal Behavior:    Verbal: xxx    Attempts: xxx      Self-Injurious/Self-Mutilation: (Specify) History of cutting      Traumatic Event Within Past 2 Weeks: Denies(Specify)       Current Abuse:  (Specify) Denies      Legal: (Specify) Denies      Violence: (Specify) Denies      Protective Factors: Active outpatient care for mental health treatment. Housing: Resides alone, siblings visit. CPAP/Oxygen/Ambulation Difficulties: na      Basic Vital Signs:      Critical Labs:      Risk Factors: Age,       Clinical Summary:    Patient is a single [de-identified] [age] year old female who presents voluntarily to the ER. Patient reports current suicidal thought , plan and intent to 'hang' herself. Patient states 'Eventually I'm doing it'. 'I need to find the perfect time when my siblings aren't there'. Patient has active outpatient care with [facility]. Patient reports history of suicide attempts and self harm. Patient has no children. Patient has four siblings. She has contact with two of her siblings. Patient denies delusions/hallucinations. Patient denies access to weapons. Patient denies legal concerns. No alcohol/illicit drug use is reported. Level of Care Disposition:      Consulted with Noa Palmer CNP concerning the mental status of patient. Consulted with Dr. Phillip Davidson concerning the mental status of patient. Patient is accepted to the care of Dr. Phillip Davidson under voluntary status. ER staff updated on plan of care.  Report given to Sheila Mccarthy on 4E.

## 2023-02-23 NOTE — PROGRESS NOTES
Psychosocial Assessment    Current Level of Psychosocial Functioning     Independent  XXX  Dependent    Minimal Assist     Comments:      Psychosocial High Risk Factors (check all that apply)    Unable to obtain meds   Chronic illness/pain    Substance abuse   Lack of Family Support   Financial stress   Isolation   Inadequate Community Resources  Suicide attempt(s)  Not taking medications   Victim of crime   Developmental Delay  Unable to manage personal needs    Age 72 or older   Homeless  No transportation   Readmission within 30 days  Unemployment  XXX  Traumatic Event    Family/Supports identified: Dad and step-mom    Sexual Orientation:  heterosexual    Patient Strengths: connected to outpatient services    Patient Barriers: familial stressor    Safety plan: on-going,q15 minute safety checks    CMHC/MH history: see clinical summary for details    Plan of Care:  medication management, group/individual therapies, family meetings, psycho -education, treatment team meetings to assist with stabilization    Initial Discharge Plan:  pt will return home and continue follow up with [address] [place] [address] Summary:      Pt is a 25year old female admitted to the unit from the ED voluntarily. Pt reports she was experiencing suicidal thoughts with a plan to hang herself. Pt identifies triggers as familial difficulties with her mom and caring for her younger siblings as well as a relationship ending recently. Pt denies any current suicidal/homicidal ideation. Pt is connected to both counseling and psychiatry services at [facility]. Pt dneies any current legal involvement/history. Pt denies any substance use of any kind. Pt is not currently employed.

## 2023-02-24 LAB
ALBUMIN SERPL BCG-MCNC: 4.4 G/DL (ref 3.5–5.1)
ALP SERPL-CCNC: 93 U/L (ref 38–126)
ALT SERPL W/O P-5'-P-CCNC: 164 U/L (ref 11–66)
AST SERPL-CCNC: 114 U/L (ref 5–40)
BILIRUB CONJ SERPL-MCNC: < 0.2 MG/DL (ref 0–0.3)
BILIRUB SERPL-MCNC: 0.6 MG/DL (ref 0.3–1.2)
CHOLESTEROL, FASTING: 151 MG/DL (ref 100–199)
DEPRECATED MEAN GLUCOSE BLD GHB EST-ACNC: 117 MG/DL (ref 70–126)
DEPRECATED RDW RBC AUTO: 44.4 FL (ref 35–45)
ERYTHROCYTE [DISTWIDTH] IN BLOOD BY AUTOMATED COUNT: 13.8 % (ref 11.5–14.5)
ERYTHROCYTE [SEDIMENTATION RATE] IN BLOOD BY WESTERGREN METHOD: 22 MM/HR (ref 0–20)
HAV IGM SER QL: NEGATIVE
HBA1C MFR BLD HPLC: 5.9 % (ref 4.4–6.4)
HBV CORE IGM SERPL QL IA: NEGATIVE
HBV SURFACE AG SERPL QL IA: NEGATIVE
HCT VFR BLD AUTO: 42.5 % (ref 37–47)
HCV IGG SERPL QL IA: NEGATIVE
HDLC SERPL-MCNC: 30 MG/DL
HGB BLD-MCNC: 12.9 GM/DL (ref 12–16)
LDLC SERPL CALC-MCNC: 94 MG/DL
MCH RBC QN AUTO: 26.4 PG (ref 26–33)
MCHC RBC AUTO-ENTMCNC: 30.4 GM/DL (ref 32.2–35.5)
MCV RBC AUTO: 87.1 FL (ref 81–99)
PLATELET # BLD AUTO: 307 THOU/MM3 (ref 130–400)
PMV BLD AUTO: 9.7 FL (ref 9.4–12.4)
PROT SERPL-MCNC: 7.3 G/DL (ref 6.1–8)
RBC # BLD AUTO: 4.88 MILL/MM3 (ref 4.2–5.4)
TRIGLYCERIDE, FASTING: 136 MG/DL (ref 0–199)
TSH SERPL DL<=0.005 MIU/L-ACNC: 2.5 UIU/ML (ref 0.4–4.2)
WBC # BLD AUTO: 10.2 THOU/MM3 (ref 4.8–10.8)

## 2023-02-24 PROCEDURE — 6370000000 HC RX 637 (ALT 250 FOR IP): Performed by: PSYCHIATRY & NEUROLOGY

## 2023-02-24 PROCEDURE — 99232 SBSQ HOSP IP/OBS MODERATE 35: CPT | Performed by: NURSE PRACTITIONER

## 2023-02-24 PROCEDURE — 80076 HEPATIC FUNCTION PANEL: CPT

## 2023-02-24 PROCEDURE — 93010 ELECTROCARDIOGRAM REPORT: CPT | Performed by: NUCLEAR MEDICINE

## 2023-02-24 PROCEDURE — 85651 RBC SED RATE NONAUTOMATED: CPT

## 2023-02-24 PROCEDURE — 6370000000 HC RX 637 (ALT 250 FOR IP): Performed by: PHYSICIAN ASSISTANT

## 2023-02-24 PROCEDURE — 93005 ELECTROCARDIOGRAM TRACING: CPT | Performed by: STUDENT IN AN ORGANIZED HEALTH CARE EDUCATION/TRAINING PROGRAM

## 2023-02-24 PROCEDURE — 84443 ASSAY THYROID STIM HORMONE: CPT

## 2023-02-24 PROCEDURE — APPSS30 APP SPLIT SHARED TIME 16-30 MINUTES: Performed by: PHYSICIAN ASSISTANT

## 2023-02-24 PROCEDURE — 1240000000 HC EMOTIONAL WELLNESS R&B

## 2023-02-24 PROCEDURE — 80061 LIPID PANEL: CPT

## 2023-02-24 PROCEDURE — 36415 COLL VENOUS BLD VENIPUNCTURE: CPT

## 2023-02-24 PROCEDURE — 83036 HEMOGLOBIN GLYCOSYLATED A1C: CPT

## 2023-02-24 PROCEDURE — 85027 COMPLETE CBC AUTOMATED: CPT

## 2023-02-24 RX ORDER — BUSPIRONE HYDROCHLORIDE 7.5 MG/1
15 TABLET ORAL 2 TIMES DAILY
Status: DISCONTINUED | OUTPATIENT
Start: 2023-02-24 | End: 2023-02-27 | Stop reason: HOSPADM

## 2023-02-24 RX ADMIN — ARIPIPRAZOLE 10 MG: 10 TABLET ORAL at 00:09

## 2023-02-24 RX ADMIN — FLUOXETINE 60 MG: 20 CAPSULE ORAL at 08:21

## 2023-02-24 RX ADMIN — TRAZODONE HYDROCHLORIDE 50 MG: 50 TABLET ORAL at 20:32

## 2023-02-24 RX ADMIN — BUSPIRONE HYDROCHLORIDE 10 MG: 10 TABLET ORAL at 00:09

## 2023-02-24 RX ADMIN — TRAZODONE HYDROCHLORIDE 50 MG: 50 TABLET ORAL at 00:09

## 2023-02-24 RX ADMIN — ARIPIPRAZOLE 10 MG: 10 TABLET ORAL at 20:31

## 2023-02-24 RX ADMIN — BUSPIRONE HYDROCHLORIDE 10 MG: 10 TABLET ORAL at 08:21

## 2023-02-24 RX ADMIN — BUSPIRONE HYDROCHLORIDE 15 MG: 7.5 TABLET ORAL at 20:31

## 2023-02-24 NOTE — PLAN OF CARE
Problem: Self Harm/Suicidality  Goal: Will have no self-injury during hospital stay  Description: INTERVENTIONS:  1. Ensure constant observer at bedside with Q15M safety checks  2. Maintain a safe environment  3. Secure patient belongings  4. Ensure family/visitors adhere to safety recommendations  5. Ensure safety tray has been added to patient's diet order  6. Every shift and PRN: Re-assess suicidal risk via Frequent Screener    2/24/2023 1252 by Fermin Witt RN  Outcome: Progressing  Flowsheets (Taken 2/24/2023 1206)  Will have no self-injury during hospital stay: Maintain a safe environment  2/23/2023 2356 by Cameron Edmond RN  Outcome: Progressing  Flowsheets (Taken 2/23/2023 0608 by Tiny Payan RN)  Will have no self-injury during hospital stay: Maintain a safe environment     Problem: Depression  Goal: Will be euthymic at discharge  Description: INTERVENTIONS:  1. Administer medication as ordered  2. Provide emotional support via 1:1 interaction with staff  3. Encourage involvement in milieu/groups/activities  4. Monitor for social isolation  2/24/2023 1252 by Fermin Witt RN  Outcome: Not Progressing  Note: Pt brightens with interaction but tells this nurse that she fells \"not that great\"  2/23/2023 2356 by Cameron Edmond RN  Outcome: Progressing  Note: Rates mood a 5/10     Problem: Sleep Disturbance  Goal: Will exhibit normal sleeping pattern  Description: INTERVENTIONS:  1. Administer medication as ordered  2. Decrease environmental stimuli, including noise, as appropriate  3.  Discourage social isolation and naps during the day  Outcome: Progressing     Problem: Discharge Planning  Goal: Discharge to home or other facility with appropriate resources  2/24/2023 1252 by Fermin Witt RN  Outcome: Progressing  2/23/2023 2356 by Cameron Edmond RN  Outcome: Progressing  Flowsheets (Taken 2/23/2023 0608 by Tiny Payan RN)  Discharge to home or other facility with appropriate resources: Identify barriers to discharge with patient and caregiver     Problem: Coping  Goal: Pt/Family able to verbalize concerns and demonstrate effective coping strategies  Description: INTERVENTIONS:  1. Assist patient/family to identify coping skills, available support systems and cultural and spiritual values  2. Provide emotional support, including active listening and acknowledgement of concerns of patient and caregivers  3. Reduce environmental stimuli, as able  4. Instruct patient/family in relaxation techniques, as appropriate  5. Assess for spiritual pain/suffering and initiate Spiritual Care, Psychosocial Clinical Specialist consults as needed  2/24/2023 1252 by Pierce Hinton RN  Outcome: Not Progressing  Flowsheets (Taken 2/24/2023 1206)  Patient/family able to verbalize anxieties, fears, and concerns, and demonstrate effective coping: Provide emotional support, including active listening and acknowledgement of concerns of patient and caregivers  Note: Patient reports some coping skills. Patient is not attending therapeutic groups to gain insight on mental illness and learn positive coping skills. 2/24/2023 1156 by Stewart Bellamy  Outcome: Not Progressing  2/23/2023 2356 by Leobardo Leventhal, RN  Outcome: Progressing  Flowsheets (Taken 2/23/2023 2356)  Patient/family able to verbalize anxieties, fears, and concerns, and demonstrate effective coping: Assist patient/family to identify coping skills, available support systems and cultural and spiritual values     Problem: Depression  Goal: Will be euthymic at discharge  Description: INTERVENTIONS:  1. Administer medication as ordered  2. Provide emotional support via 1:1 interaction with staff  3. Encourage involvement in milieu/groups/activities  4.  Monitor for social isolation  2/24/2023 1252 by Pierce Hinton RN  Outcome: Not Progressing  Note: Pt brightens with interaction but tells this nurse that she fells \"not that great\"  2/23/2023 2356 by Leobardo Leventhal, RN  Outcome: Progressing  Note: Rates mood a 5/10     Problem: Coping  Goal: Pt/Family able to verbalize concerns and demonstrate effective coping strategies  Description: INTERVENTIONS:  1. Assist patient/family to identify coping skills, available support systems and cultural and spiritual values  2. Provide emotional support, including active listening and acknowledgement of concerns of patient and caregivers  3. Reduce environmental stimuli, as able  4. Instruct patient/family in relaxation techniques, as appropriate  5. Assess for spiritual pain/suffering and initiate Spiritual Care, Psychosocial Clinical Specialist consults as needed  2/24/2023 1252 by Gisselle Mccarty RN  Outcome: Not Progressing  Flowsheets (Taken 2/24/2023 1206)  Patient/family able to verbalize anxieties, fears, and concerns, and demonstrate effective coping: Provide emotional support, including active listening and acknowledgement of concerns of patient and caregivers  Note: Patient reports some coping skills. Patient is not attending therapeutic groups to gain insight on mental illness and learn positive coping skills. 2/24/2023 1156 by Nella Purdy  Outcome: Not Progressing  2/23/2023 2356 by Eze Fierro RN  Outcome: Progressing  Flowsheets (Taken 2/23/2023 2356)  Patient/family able to verbalize anxieties, fears, and concerns, and demonstrate effective coping: Assist patient/family to identify coping skills, available support systems and cultural and spiritual values   Care plan reviewed with patient.   Patient does verbalize understanding of the plan of care and does not contribute to goal setting

## 2023-02-24 NOTE — PLAN OF CARE
Problem: Self Harm/Suicidality  Goal: Will have no self-injury during hospital stay  Description: INTERVENTIONS:  1. Ensure constant observer at bedside with Q15M safety checks  2. Maintain a safe environment  3. Secure patient belongings  4. Ensure family/visitors adhere to safety recommendations  5. Ensure safety tray has been added to patient's diet order  6. Every shift and PRN: Re-assess suicidal risk via Frequent Screener    2/23/2023 2356 by Cole Ramirez RN  Outcome: Progressing  Flowsheets (Taken 2/23/2023 0608 by Shea Gould, RN)  Will have no self-injury during hospital stay: Maintain a safe environment  2/23/2023 1013 by Josephine Camarena RN  Outcome: Progressing  [address] (Taken 2/23/2023 0608 by Shea Gould, RN)  Will have no self-injury during hospital stay: Maintain a safe environment  Note: Patient denies self harm at this time. 15 minute safety checks in place. Problem: Depression  Goal: Will be euthymic at discharge  Description: INTERVENTIONS:  1. Administer medication as ordered  2. Provide emotional support via 1:1 interaction with staff  3. Encourage involvement in milieu/groups/activities  4. Monitor for social isolation  2/23/2023 2356 by Cole Ramirez RN  Outcome: Progressing  Note: Rates mood a 5/10  2/23/2023 1013 by Josephine Camarena RN  Outcome: Progressing  Note: Ongoing, Patient currently rates mood as a 5/10 with 10 being the best mood.      Problem: Discharge Planning  Goal: Discharge to home or other facility with appropriate resources  2/23/2023 2356 by Cole Ramirez RN  Outcome: Progressing  Flowsheets (Taken 2/23/2023 0608 by Shea Gould, RN)  Discharge to home or other facility with appropriate resources: Identify barriers to discharge with patient and caregiver  2/23/2023 1013 by Josephine Camarena RN  Outcome: Progressing  Flowsheets (Taken 2/23/2023 0608 by Shea Gould, RN)  Discharge to home or other facility with appropriate resources: Identify barriers to discharge with patient and caregiver  Note: Discharge planning ongoing at this time. Patient plans on following up with [facility]. Problem: Coping  Goal: Pt/Family able to verbalize concerns and demonstrate effective coping strategies  Description: INTERVENTIONS:  1. Assist patient/family to identify coping skills, available support systems and cultural and spiritual values  2. Provide emotional support, including active listening and acknowledgement of concerns of patient and caregivers  3. Reduce environmental stimuli, as able  4. Instruct patient/family in relaxation techniques, as appropriate  5. Assess for spiritual pain/suffering and initiate Spiritual Care, Psychosocial Clinical Specialist consults as needed  2/23/2023 2356 by Annamarie Sacks, RN  Outcome: Progressing  Flowsheets (Taken 2/23/2023 2356)  Patient/family able to verbalize anxieties, fears, and concerns, and demonstrate effective coping: Assist patient/family to identify coping skills, available support systems and cultural and spiritual values  2/23/2023 1445 by Lucy Henderson  Outcome: Not Progressing     Problem: Coping  Goal: Pt/Family able to verbalize concerns and demonstrate effective coping strategies  Description: INTERVENTIONS:  1. Assist patient/family to identify coping skills, available support systems and cultural and spiritual values  2. Provide emotional support, including active listening and acknowledgement of concerns of patient and caregivers  3. Reduce environmental stimuli, as able  4. Instruct patient/family in relaxation techniques, as appropriate  5.  Assess for spiritual pain/suffering and initiate Spiritual Care, Psychosocial Clinical Specialist consults as needed  2/23/2023 2356 by Annamarie Sacks, RN  Outcome: Progressing  Flowsheets (Taken 2/23/2023 2356)  Patient/family able to verbalize anxieties, fears, and concerns, and demonstrate effective coping: Assist patient/family to identify coping skills, available support systems and cultural and spiritual values  2/23/2023 1445 by Lawrence Soto  Outcome: Not Progressing     Care plan reviewed with patient. Patient verbalize understanding of the plan of care and contribute to goal setting.

## 2023-02-24 NOTE — PROGRESS NOTES
Department of Psychiatry  Progress Note     Chief Complaint:   suicidal ideation with plan and intent to hang herself        PROGRESS:  Leeann presents to the interview room. She states she is doing \"pretty okay\" today. Patient talks about the stress from her family prior to admission. She states that her siblings are mean to her. Her siblings basically live with her every day. Her mother has full custody of them. Leeann said that when she tries to talk to her stepmom about the stress of taking care of them, her stepmom thinks she is being irrational.  She does reports CPS has been involved for her siblings in the past but nothing was ever done. Leeann mentioned she gets SSI for her mental health and is basically supporting her siblings most of the time. Leeann continues to feel depressed. She continues to have passive suicidal thoughts but denies any plan or intent at this time. She reports she has suicidal thoughts \"almost all the time. \"  Reports her suicidal thoughts are a little bit better today. She is able to contract for safety on the unit. She does continue to feel hopeless and helpless about her situation at times. She reports she has been sleeping and eating good on the unit. Staff reports she slept 7 hours broken last night. She has been compliant with her medications and denies any side effects. She would like for medications to be increased. She has been out on the unit at times and has been attending groups. Patient mentioned that her patient psychiatric provider was planning on increasing her Abilify in order to get her on the Abilify Maintena long acting injectable because the patient has struggled with medication noncompliance in the past.  Patient denies any history of hallucinations, delusions, henrique or hypomania.     Suicidal ideations: Passive without current plan or intent  Compliance with medications: good   Medication side effects: absent  ROS: Patient has new complaints: no  Sleep quality: 7 hours broken last night per staff  Attending groups: yes      OBJECTIVE      Medications  Current Facility-Administered Medications: busPIRone (BUSPAR) tablet 15 mg, 15 mg, Oral, BID  acetaminophen (TYLENOL) tablet 650 mg, 650 mg, Oral, Q4H PRN  ibuprofen (ADVIL;MOTRIN) tablet 400 mg, 400 mg, Oral, Q6H PRN  magnesium hydroxide (MILK OF MAGNESIA) 400 MG/5ML suspension 30 mL, 30 mL, Oral, Daily PRN  aluminum & magnesium hydroxide-simethicone (MAALOX) 200-200-20 MG/5ML suspension 30 mL, 30 mL, Oral, Q6H PRN  hydrOXYzine HCl (ATARAX) tablet 50 mg, 50 mg, Oral, TID PRN  traZODone (DESYREL) tablet 50 mg, 50 mg, Oral, Nightly PRN  FLUoxetine (PROZAC) capsule 60 mg, 60 mg, Oral, Daily  ARIPiprazole (ABILIFY) tablet 10 mg, 10 mg, Oral, Nightly     Physical     height is 5' (1.524 m) and weight is 197 lb (89.4 kg). Her tympanic temperature is 96.8 °F (36 °C). Her blood pressure is 127/78 and her pulse is 63. Her respiration is 18 and oxygen saturation is 99%.    Lab Results   Component Value Date    WBC 10.2 02/24/2023    HGB 12.9 02/24/2023    HCT 42.5 02/24/2023     02/24/2023    HDL 30 02/24/2023     (H) 02/24/2023     (H) 02/24/2023     02/23/2023    K 3.5 02/23/2023     02/23/2023    CREATININE 0.6 02/23/2023    BUN 11 02/23/2023    CO2 26 02/23/2023    TSH 2.500 02/24/2023    INR 1.00 02/23/2023    LABA1C 5.9 02/24/2023          Mental Status Exam:   Level of consciousness: Awake  Appearance:  well-appearing, personal attire , in chair, good grooming and good hygiene  Behavior/Motor:  no abnormalities noted  Attitude toward examiner:  cooperative, attentive and good eye contact  Speech:  spontaneous, normal rate and normal volume  Mood: \"Pretty okay\" per patient  Affect: Blunted but brightens  Thought processes:  linear, goal directed and coherent  Thought content:  Denies homicidal ideation  Suicidal Ideation: Passive suicidal ideation without current plan or intent  Delusions:  no evidence of delusions  Perceptual Disturbance:  denies any perceptual disturbance  Cognition: Patient is oriented to person, place, time and situation  Concentration: clinically adequate  Memory: intact  Insight & Judgement: Fair      ASSESSMENT     Severe episode of recurrent major depressive disorder, without psychotic features ([address]   NARINDER  PTSD    PLAN    Patient's symptoms show minimal improvement today  Medication adjustments as discussed with the attending physician: We will increase BuSpar to 15 mg twice daily  Hospitalist is following. Ultrasound of the liver revealed fatty changes and gallbladder sludge  Side effects and risks versus benefits of medications were discussed with the patient   Attempt to develop insight, psycho-education and supportive therapy conducted. Probable discharge: 2-3 days   Follow-up: [facility] outpatient, daily while on inpatient unit    Electronically signed by Sherice Giles PA-C on 2/24/2023 at 1:29 PM Reviewed patient's current plan of care and vital signs with nursing staff. **This report has been created using voice recognition software. It may contain minor errors which are inherent in voice recognition technology. **                                    Psychiatry Attending Attestation     I assessed this patient and reviewed the case and plan of care with Sherice Giles PA-C. I have reviewed the above documentation and I agree with the findings and treatment plan with the following updates. Patient reports that she continues to feel sad down and low. Reports her suicidal thoughts are across her mind. Reports feeling very anxious today. Discussed with her about continue to titrate BuSpar and she is agreeable to the plan. PLAN  Patient s symptoms   are improving  Will continue to titrate Buspar  Attempt to develop insight  Psycho-education conducted. Supportive Therapy conducted.   Probable discharge is TBD  Follow-up TBD    More than 16 mins of the session was spent doing Supportive psychotherapy and coordinating care. Session lasted for over 30 mins.     Patient was evaluated by Carlo Rodriguez PA-C on the unit in person and I evaluated patient as Tele visit.  This Virtual Visit was conducted with patient's consent. The patient is located in a state where I am licensed to provide care.   Leeann Stone is a 22 y.o. female being evaluated by a Virtual Visit (video visit) encounter to address concerns as mentioned above.  A caregiver was present in the room along with the patient.  Patient is present at [facility] and I am physically present at my home in [place], [place]     --Peggy Quintana MD on 2/24/2023 at 3:51 PM    An electronic signature was used to authenticate this note.     **This report has been created using voice recognition software. It may contain minor errors which are inherent in voice recognition technology.**

## 2023-02-24 NOTE — PLAN OF CARE
Patient has not attended any of the groups so far today and has been isolating in her room this morning so she has not been able to demonstrate effective coping strategies at this time. Patient will continue to be encouraged to attend all groups on the unit daily and to come out of her room for social interaction with others during the rest of her hospital stay.      Problem: Coping  Goal: Pt/Family able to verbalize concerns and demonstrate effective coping strategies  2/24/2023 1156 by Martín Bond  Outcome: Not Progressing  2/23/2023 2356 by Yuly Fonseca RN  Outcome: Progressing  Flowsheets (Taken 2/23/2023 2356)  Patient/family able to verbalize anxieties, fears, and concerns, and demonstrate effective coping: Assist patient/family to identify coping skills, available support systems and cultural and spiritual values

## 2023-02-24 NOTE — PROGRESS NOTES
Hospitalist Progress Note    Patient:  Willie Prior      Unit/Bed:4E-56/056-B    YOB: 2000    MRN: [id number]       Acct: [de-identified]     PCP: Unknown Unknown    Date of Admission: 2/23/2023    Assessment/Plan:    Acute transaminitis--ALT noted be 200 with AST at 145 on admission and on 2/24 ALT improved to 164 with AST at 114; ultrasound of the liver showed fatty change of the liver along with gallbladder sludge but no evidence of cholelithiasis or cholecystitis; patient is completely asymptomatic, eating without issues, recommend outpatient follow-up with PCP in 1 week with repeat LFTs and placed in epic  Chronic leukocytosis--resolved  Elevated TSH--resulted to normal x2, needs outpatient follow-up  Morbid obesity with BMI 38.47    Expected discharge date: Per primary; hospitalist will sign off please call as needed    Disposition:    [x] Home       [] TCU       [] Rehab       [] Psych       [] SNF       [] [place]       [] Other-    Chief Complaint: Following for medical management    Hospital Course: Per consult note done 2/23/2023: \"Opening Statement:  The patient is a 25year old female with a past medical history of morbid obesity, depression, anxiety, and PTSD who was admitted for suicidal ideation and who the hospitalist service is seeing for medical management of elevated TSH, leukocytosis, and transaminitis. HPI/Summary Hospital Course:   Patient affirms that she came to [facility] on the morning of 2/23/2023 for suicidal thoughts with plan to hang herself for the last week. Attempted to commit suicide by hanging 4-5 months ago. Patient denies any other specific complaints at this time. Denies any current fevers, chills, headache, dizziness, chest pain, palpitations, cough, shortness of breath, abdominal pain, nausea, vomiting, constipation, dysuria, and hematuria.    Affirms diarrhea for the past several days which she describes as a single watery, large volume bowel movement per day. Affirms medical history described above. States that she takes her medications consistently. Denies any significant medication reactions. Dill oil listed in the chart as allergy. Denies any other recent illnesses, injuries, hospitalizations, or falls except as above. However, admitted to [facility] psychiatry on July and November 2020. Admitted to [facility] about a year ago. Denies any history of tobacco use, alcohol use, or illicit drug use. Electrolyte panel unremarkable except for slightly elevated glucose of 140. Liver panel notable for an ALT of 155, and AST of 92, and no other significant abnormalities. Pregnancy test negative. Initial TSH of 6.500 and repeat TSH of 3.370. Initial ethyl alcohol level negative. Urine drug screen negative. Urine cannabinoid and PCP testing negative. Acetaminophen level negative. Oxycodone negative. Salicylate level negative. Fentanyl negative. CBC notable for normal leukocytosis which appears to be baseline. Otherwise, normal hemoglobin and normal platelet count. INR and APTT within the normal range. Urinalysis completely unremarkable. \"    2/24--> hemodynamically stable; LFTs noted and discussed with patient along with ultrasound of the liver       Subjective (past 24 hours): Patient denies any pain, denies any nausea, eating without any issues      Medications:  Reviewed    Infusion Medications   Scheduled Medications    FLUoxetine  60 mg Oral Daily    busPIRone  10 mg Oral BID    ARIPiprazole  10 mg Oral Nightly     PRN Meds: acetaminophen, ibuprofen, magnesium hydroxide, aluminum & magnesium hydroxide-simethicone, hydrOXYzine HCl, traZODone      Intake/Output Summary (Last 24 hours) at 2/24/2023 1241  Last data filed at 2/24/2023 0013  Gross per 24 hour   Intake 240 ml   Output --   Net 240 ml       Diet:  ADULT DIET;  Regular    Exam:  /78   Pulse 63  Temp 96.8 °F (36 °C) (Tympanic)   Resp 18   Ht 5' (1.524 m)   Wt 197 lb (89.4 kg)   SpO2 99%   BMI 38.47 kg/m²     General appearance: No apparent distress, appears stated age and cooperative.  HEENT: Pupils equal, round, and reactive to light. Conjunctivae/corneas clear.  Neck: Supple, with full range of motion. No jugular venous distention. Trachea midline.  Respiratory:  Normal respiratory effort. Clear to auscultation, bilaterally without Rales/Wheezes/Rhonchi.  Cardiovascular: Regular rate and rhythm with normal S1/S2 without murmurs, rubs or gallops.  Abdomen: Soft, non-tender, non-distended with normal bowel sounds.  Musculoskeletal: passive and active ROM x 4 extremities.  Skin: Skin color, texture, turgor normal.    Neurologic:  Neurovascularly intact without any focal sensory/motor deficits. Cranial nerves: II-XII intact, grossly non-focal.  Psychiatric: Alert and oriented, thought content appropriate  Capillary Refill: Brisk,< 3 seconds   Peripheral Pulses: +2 palpable, equal bilaterally     Labs:   Recent Labs     02/23/23  0144 02/24/23  0706   WBC 13.3* 10.2   HGB 12.9 12.9   HCT 40.8 42.5    307     Recent Labs     02/23/23  0144 02/23/23  1200     --    K 3.5  --      --    CO2 26  --    BUN 11  --    CREATININE 0.6  --    CALCIUM 9.3  --    PHOS  --  4.1     Recent Labs     02/23/23  0144 02/23/23  1200 02/24/23  0706   * 92* 114*   * 155* 164*   BILIDIR <0.2 <0.2 <0.2   BILITOT 0.3 0.3 0.6   ALKPHOS 104 86 93     Recent Labs     02/23/23  1640   INR 1.00     Microbiology:    None    Urinalysis:      Lab Results   Component Value Date/Time    NITRU NEGATIVE 02/23/2023 01:42 AM    WBCUA 5-9 11/13/2020 08:15 PM    BACTERIA MANY 11/13/2020 08:15 PM    RBCUA 3-5 11/13/2020 08:15 PM    BLOODU NEGATIVE 02/23/2023 01:42 AM    SPECGRAV 1.010 07/16/2020 02:50 PM    GLUCOSEU NEGATIVE 02/23/2023 01:42 AM       Radiology:  US LIVER SPLEEN    Result Date:  2/24/2023  Ultrasound abdomen COMPARISON: No prior FINDINGS: The pancreas and visualized segment of the IVC are grossly unremarkable. The liver is normal in size measuring 16-17 cm in length. The liver parenchyma is echogenic suggesting fatty change. Normal hepatopedal flow is demonstrated within the portal vein. There is sludge within the gallbladder lumen. No gallstones are seen in the gallbladder wall is normal in thickness. The common bile duct is normal in caliber measuring 2-3 mm. The spleen is normal in size measuring 11 cm in length. Kidneys are normal in size without hydronephrosis. Fatty change of the liver. Gallbladder sludge. There is no evidence for cholelithiasis or cholecystitis.  This document has been electronically signed by: Danetta Ormond, MD on 02/24/2023 02:56 AM      DVT prophylaxis: [] Lovenox                                 [] SCDs                                 [] SQ Heparin                                 [x] Encourage ambulation           [] Already on Anticoagulation     Code Status: Full Code    PT/OT Eval Status: Ambulate    Tele:   [] yes             [x] no    Active Hospital Problems    Diagnosis Date Noted    Severe episode of recurrent major depressive disorder, without psychotic features ([facility] [address] [F33.2] 11/14/2020       Electronically signed by KESHA Beebe CNP on 2/24/2023 at 12:41 PM

## 2023-02-25 PROBLEM — F32.A DEPRESSION WITH SUICIDAL IDEATION: Status: ACTIVE | Noted: 2020-07-16

## 2023-02-25 PROBLEM — R45.851 DEPRESSION WITH SUICIDAL IDEATION: Status: ACTIVE | Noted: 2020-07-16

## 2023-02-25 LAB
ALBUMIN SERPL BCG-MCNC: 4.1 G/DL (ref 3.5–5.1)
ALP SERPL-CCNC: 89 U/L (ref 38–126)
ALT SERPL W/O P-5'-P-CCNC: 156 U/L (ref 11–66)
AST SERPL-CCNC: 100 U/L (ref 5–40)
BILIRUB CONJ SERPL-MCNC: < 0.2 MG/DL (ref 0–0.3)
BILIRUB SERPL-MCNC: 0.3 MG/DL (ref 0.3–1.2)
DEPRECATED RDW RBC AUTO: 43.5 FL (ref 35–45)
ERYTHROCYTE [DISTWIDTH] IN BLOOD BY AUTOMATED COUNT: 13.9 % (ref 11.5–14.5)
HCT VFR BLD AUTO: 40.6 % (ref 37–47)
HGB BLD-MCNC: 12.6 GM/DL (ref 12–16)
MCH RBC QN AUTO: 26.6 PG (ref 26–33)
MCHC RBC AUTO-ENTMCNC: 31 GM/DL (ref 32.2–35.5)
MCV RBC AUTO: 85.7 FL (ref 81–99)
PLATELET # BLD AUTO: 306 THOU/MM3 (ref 130–400)
PMV BLD AUTO: 9.9 FL (ref 9.4–12.4)
PROT SERPL-MCNC: 7.1 G/DL (ref 6.1–8)
RBC # BLD AUTO: 4.74 MILL/MM3 (ref 4.2–5.4)
WBC # BLD AUTO: 11 THOU/MM3 (ref 4.8–10.8)

## 2023-02-25 PROCEDURE — 6370000000 HC RX 637 (ALT 250 FOR IP): Performed by: PHYSICIAN ASSISTANT

## 2023-02-25 PROCEDURE — 80076 HEPATIC FUNCTION PANEL: CPT

## 2023-02-25 PROCEDURE — 6370000000 HC RX 637 (ALT 250 FOR IP): Performed by: PSYCHIATRY & NEUROLOGY

## 2023-02-25 PROCEDURE — 85027 COMPLETE CBC AUTOMATED: CPT

## 2023-02-25 PROCEDURE — 1240000000 HC EMOTIONAL WELLNESS R&B

## 2023-02-25 PROCEDURE — 36415 COLL VENOUS BLD VENIPUNCTURE: CPT

## 2023-02-25 RX ADMIN — BUSPIRONE HYDROCHLORIDE 15 MG: 7.5 TABLET ORAL at 20:30

## 2023-02-25 RX ADMIN — BUSPIRONE HYDROCHLORIDE 15 MG: 7.5 TABLET ORAL at 08:13

## 2023-02-25 RX ADMIN — TRAZODONE HYDROCHLORIDE 50 MG: 50 TABLET ORAL at 20:30

## 2023-02-25 RX ADMIN — ARIPIPRAZOLE 10 MG: 10 TABLET ORAL at 20:30

## 2023-02-25 RX ADMIN — FLUOXETINE 60 MG: 20 CAPSULE ORAL at 08:13

## 2023-02-25 ASSESSMENT — PAIN SCALES - GENERAL: PAINLEVEL_OUTOF10: 0

## 2023-02-25 NOTE — PLAN OF CARE
Problem: Self Harm/Suicidality  Goal: Will have no self-injury during hospital stay  Description: INTERVENTIONS:  1. Ensure constant observer at bedside with Q15M safety checks  2. Maintain a safe environment  3. Secure patient belongings  4. Ensure family/visitors adhere to safety recommendations  5. Ensure safety tray has been added to patient's diet order  6. Every shift and PRN: Re-assess suicidal risk via Frequent Screener    Outcome: Progressing  Flowsheets (Taken 2/25/2023 0800)  Will have no self-injury during hospital stay:   Maintain a safe environment   Every shift and PRN: Re-assess suicidal risk via Frequent Screener  Note: No self harm behaviors were observed or reported so far this shift. Remains on every 15 minutes precautions for safety. Patient denies suicidal ideations at present time       Problem: Depression  Goal: Will be euthymic at discharge  Description: INTERVENTIONS:  1. Administer medication as ordered  2. Provide emotional support via 1:1 interaction with staff  3. Encourage involvement in milieu/groups/activities  4. Monitor for social isolation  Note: Patient reports mood 6/10 with 10 being normal. Has flat affect. Speech clear. Good eye contact. Reports hope for future and identifies family as their support system. Patient reports depression at present time. Problem: Sleep Disturbance  Goal: Will exhibit normal sleeping pattern  Description: INTERVENTIONS:  1. Administer medication as ordered  2. Decrease environmental stimuli, including noise, as appropriate  3. Discourage social isolation and naps during the day  Outcome: Progressing  Note: Patient slept 8.5 hours last night, reports she slept well. Encourage patient not to take naps during the day, relax several hours before bed and to take prescribed sleep meds as ordered. Patient verbalized understanding. Problem: Anxiety  Goal: Will report anxiety at manageable levels  Description: INTERVENTIONS:  1. Administer medication as ordered  2. Teach and rehearse alternative coping skills  3. Provide emotional support with 1:1 interaction with staff  Outcome: Progressing  Flowsheets (Taken 2/25/2023 0800)  Will report anxiety at manageable levels:   Teach and rehearse alternative coping skills   Provide emotional support with 1:1 interaction with staff  Note: Patient reports denies anxiety. Problem: Discharge Planning  Goal: Discharge to home or other facility with appropriate resources  Outcome: Progressing  Flowsheets (Taken 2/25/2023 0800)  Discharge to home or other facility with appropriate resources:   Identify barriers to discharge with patient and caregiver   Identify discharge learning needs (meds, wound care, etc)   Refer to discharge planning if patient needs post-hospital services based on physician order or complex needs related to functional status, cognitive ability or social support system  Note: Patient voices no needs before discharge. Patient plans to be discharged to home alone. Discharge planner working with patient to achieve optimal discharge plans, specific to individual needs. Problem: Coping  Goal: Pt/Family able to verbalize concerns and demonstrate effective coping strategies  Description: INTERVENTIONS:  1. Assist patient/family to identify coping skills, available support systems and cultural and spiritual values  2. Provide emotional support, including active listening and acknowledgement of concerns of patient and caregivers  3. Reduce environmental stimuli, as able  4. Instruct patient/family in relaxation techniques, as appropriate  5.  Assess for spiritual pain/suffering and initiate Spiritual Care, Psychosocial Clinical Specialist consults as needed  Outcome: Progressing  Flowsheets (Taken 2/25/2023 0800)  Patient/family able to verbalize anxieties, fears, and concerns, and demonstrate effective coping: Assist patient/family to identify coping skills, available support systems and cultural and spiritual values  Note: Patient reports drawing as coping skills. Patient is not attending therapeutic groups to gain insight on mental illness and learn positive coping skills. Problem: Chronic Conditions and Co-morbidities  Goal: Patient's chronic conditions and co-morbidity symptoms are monitored and maintained or improved  Outcome: Progressing  Flowsheets (Taken 2/25/2023 0800)  Care Plan - Patient's Chronic Conditions and Co-Morbidity Symptoms are Monitored and Maintained or Improved: Monitor and assess patient's chronic conditions and comorbid symptoms for stability, deterioration, or improvement     Care plan reviewed with patient. Patient verbalize understanding of the plan of care and contribute to goal setting.

## 2023-02-25 NOTE — PROGRESS NOTES
Psychiatry Progress Note      2-    CC: Suicidal ideation with plan and intent to hang self                   Subjective    Progress:  Prefers to be called \"Jennifer\" reports feeling a little better since admission. Reports depressed mood is a littl less. Denies any suicidal ideations today. Denies any homicidal ideations. Reports meds seem to be helpng a little. Denies having side effcts. Good med compliance is verified. Reports ppetite and sleep have improved some. Verified slept 8.5 hours continuous. States she attended a couple of groups yesterday. Denies getting any visits or talking to anyone on phone. States she did try to call her friend. Objective  /72   Pulse 67   Temp 98.8 °F (37.1 °C) (Tympanic)   Resp 18   Ht 5' (1.524 m)   Wt 197 lb (89.4 kg)   SpO2 97%   BMI 38.47 kg/m²      MSE:  Level of consciousness: Alert  Appearance: hospital attire, in chair and fair grooming   Behavior/Motor: Guarded   Attitude toward examiner: cooperative   Speech: Normal volume, circumstantial   Mood: Dysthymic  Affect: Reactive  Thought processes: Linear and goal directed   Suicidal Ideation: Denies suicidal ideations  Homicidal ideation: Denies homicidal ideations  Delusions: No evidence of delusions is observed  Perceptual Disturbance: Denies AH/VH  Cognition: Oriented to person, place, time and situation   Concentration fair   Memory intact   Insight: Limited  Judgment: Limited    Assessment:  Severe episode of recurrent major depressive disorder, without psychotic features ([facility] [address]   NARINDER  PTSD    Plan:  Continue current meds as ordered  Continue to encourage group attendance.       Tati Romo, CNP  5-         Major Depressive Disorder, severe recurrent without psychosis  PTSD    I concur with above clinical findings and plan of care

## 2023-02-25 NOTE — PATIENT CARE CONFERENCE
[address] [place] [address]  Day 3 Interdisciplinary Treatment Plan NOTE    Review Date & Time: 2/24/23 20:46    Patient was in treatment team    Admission Type:   Admission Type: Voluntary    Reason for admission:  Reason for Admission: Depression with suicidal thoughts  Estimated Length of Stay Update:  3-5 days  Estimated Discharge Date Update: 3-5 days    Patient Strengths/Barriers  Strengths (Must Choose Two): Independent living, Motivation level for treatment, Support from family, Support from friends  Barriers: Other (comment) (NA)  Addictive Behavior:Addictive Behavior  In the Past 3 Months, Have You Felt or Has Someone Told You That You Have a Problem With  : None  Medical Problems:  Past Medical History:   Diagnosis Date    Anxiety     Depression     PTSD (post-traumatic stress disorder)        Risk:  Fall Risk   Ivan Scale Ivan Scale Score: 21    Status EXAM:   Mental Status and Behavioral Exam  Normal: No  Level of Assistance: Independent/Self  Facial Expression: Flat, Brightened  Affect: Blunt  Level of Consciousness: Alert  Frequency of Checks: 4 times per hour, close  Mood:Normal: No  Mood: Depressed  Motor Activity:Normal: Yes  Eye Contact: Good  Observed Behavior: Friendly, Cooperative  Sexual Misconduct History: Current - no  Preception: [place] to person, [place] to time, [place] to place, [place] to situation  Attention:Normal: Yes  Thought Processes: Circumstantial  Thought Content:Normal: Yes  Depression Symptoms: Feelings of helplessness, Feelings of hopelessess  Anxiety Symptoms: No problems reported or observed. Mary Alice Symptoms: No problems reported or observed.   Hallucinations: None  Delusions: No  Memory:Normal: Yes  Insight and Judgment: No  Insight and Judgment: Poor judgment, Poor insight    Daily Assessment Last Entry:   Daily Sleep (WDL): Within Defined Limits            Daily Nutrition (WDL): Within Defined Limits  Level of Assistance: Independent/Self    Patient Monitoring:  Frequency of Checks: 4 times per hour, close    Psychiatric Symptoms:   Depression Symptoms  Depression Symptoms: Feelings of helplessness, Feelings of hopelessess  Anxiety Symptoms  Anxiety Symptoms: No problems reported or observed. Mary Alice Symptoms  Mary Alice Symptoms: No problems reported or observed. Suicide Risk CSSR-S:  1) Within the past month, have you wished you were dead or wished you could go to sleep and not wake up? : Yes  2) Have you actually had any thoughts of killing yourself? : Yes  3) Have you been thinking about how you might kill yourself? : Yes  5) Have you started to work out or worked out the details of how to kill yourself? Do you intend to carry out this plan? : Yes  6) Have you ever done anything, started to do anything, or prepared to do anything to end your life?: Yes    EDUCATION:   Learner Progress Toward Treatment Goals: Reviewed results and recommendations of this team, Reviewed group plan and strategies, Reviewed signs, symptoms and risk of self harm and violent behavior, and Reviewed goals and plan of care    Method: Individual    Outcome: Verbalized understanding and Needs reinforcement    PATIENT GOALS: Medication Adjustment     OQ TOP QUALITY PRIORITIES FOR THE PATIENT AS IDENTIFIED ON ADMISSION ADMINISTRATION:        N/A      PLAN/TREATMENT RECOMMENDATIONS UPDATE:  How are you progressing toward meeting your main treatment goal?  - Improving  2. Are there discharge barriers/lingering problems that need to be addressed?    - None at this time      3. Do you have the ability to pay for your medications? - Yes      4.   How is your group participation?    - Poor     GOALS UPDATE:   Time frame for Short-Term Goals: Daily      WILI You

## 2023-02-26 LAB
ALBUMIN SERPL BCG-MCNC: 4.3 G/DL (ref 3.5–5.1)
ALP SERPL-CCNC: 92 U/L (ref 38–126)
ALT SERPL W/O P-5'-P-CCNC: 151 U/L (ref 11–66)
AST SERPL-CCNC: 90 U/L (ref 5–40)
BILIRUB CONJ SERPL-MCNC: < 0.2 MG/DL (ref 0–0.3)
BILIRUB SERPL-MCNC: 0.3 MG/DL (ref 0.3–1.2)
DEPRECATED RDW RBC AUTO: 44.4 FL (ref 35–45)
ERYTHROCYTE [DISTWIDTH] IN BLOOD BY AUTOMATED COUNT: 13.9 % (ref 11.5–14.5)
HCT VFR BLD AUTO: 42.2 % (ref 37–47)
HGB BLD-MCNC: 12.9 GM/DL (ref 12–16)
MCH RBC QN AUTO: 26.7 PG (ref 26–33)
MCHC RBC AUTO-ENTMCNC: 30.6 GM/DL (ref 32.2–35.5)
MCV RBC AUTO: 87.2 FL (ref 81–99)
PLATELET # BLD AUTO: 332 THOU/MM3 (ref 130–400)
PMV BLD AUTO: 10.1 FL (ref 9.4–12.4)
PROT SERPL-MCNC: 7.2 G/DL (ref 6.1–8)
RBC # BLD AUTO: 4.84 MILL/MM3 (ref 4.2–5.4)
WBC # BLD AUTO: 11.4 THOU/MM3 (ref 4.8–10.8)

## 2023-02-26 PROCEDURE — 80076 HEPATIC FUNCTION PANEL: CPT

## 2023-02-26 PROCEDURE — 36415 COLL VENOUS BLD VENIPUNCTURE: CPT

## 2023-02-26 PROCEDURE — 6370000000 HC RX 637 (ALT 250 FOR IP): Performed by: PSYCHIATRY & NEUROLOGY

## 2023-02-26 PROCEDURE — 1240000000 HC EMOTIONAL WELLNESS R&B

## 2023-02-26 PROCEDURE — 6370000000 HC RX 637 (ALT 250 FOR IP): Performed by: PHYSICIAN ASSISTANT

## 2023-02-26 PROCEDURE — 85027 COMPLETE CBC AUTOMATED: CPT

## 2023-02-26 RX ORDER — ARIPIPRAZOLE 10 MG/1
10 TABLET ORAL NIGHTLY
COMMUNITY
Start: 2023-01-31

## 2023-02-26 RX ORDER — FLUOXETINE HYDROCHLORIDE 40 MG/1
40 CAPSULE ORAL DAILY
COMMUNITY
Start: 2023-01-31

## 2023-02-26 RX ADMIN — BUSPIRONE HYDROCHLORIDE 15 MG: 7.5 TABLET ORAL at 20:38

## 2023-02-26 RX ADMIN — TRAZODONE HYDROCHLORIDE 50 MG: 50 TABLET ORAL at 20:38

## 2023-02-26 RX ADMIN — ARIPIPRAZOLE 10 MG: 10 TABLET ORAL at 20:38

## 2023-02-26 RX ADMIN — FLUOXETINE 60 MG: 20 CAPSULE ORAL at 08:16

## 2023-02-26 RX ADMIN — BUSPIRONE HYDROCHLORIDE 15 MG: 7.5 TABLET ORAL at 08:16

## 2023-02-26 ASSESSMENT — PAIN SCALES - GENERAL
PAINLEVEL_OUTOF10: 0
PAINLEVEL_OUTOF10: 0

## 2023-02-26 NOTE — PLAN OF CARE
Problem: Self Harm/Suicidality  Goal: Will have no self-injury during hospital stay  Description: INTERVENTIONS:  1. Ensure constant observer at bedside with Q15M safety checks  2. Maintain a safe environment  3. Secure patient belongings  4. Ensure family/visitors adhere to safety recommendations  5. Ensure safety tray has been added to patient's diet order  6. Every shift and PRN: Re-assess suicidal risk via Frequent Screener    2/26/2023 0018 by Whitley Steiner RN  Outcome: Progressing  Flowsheets (Taken 2/25/2023 0800 by Sam Garcia RN)  Will have no self-injury during hospital stay:   Maintain a safe environment   Every shift and PRN: Re-assess suicidal risk via Frequent Screener  Note: Denies suicidal ideations. No self harm behaviors or thoughts this shift. 2/25/2023 1514 by Sam Garcia RN  Outcome: Progressing  Flowsheets (Taken 2/25/2023 0800)  Will have no self-injury during hospital stay:   Maintain a safe environment   Every shift and PRN: Re-assess suicidal risk via Frequent Screener  Note: No self harm behaviors were observed or reported so far this shift. Remains on every 15 minutes precautions for safety. Patient denies suicidal ideations at present time       Problem: Depression  Goal: Will be euthymic at discharge  Description: INTERVENTIONS:  1. Administer medication as ordered  2. Provide emotional support via 1:1 interaction with staff  3. Encourage involvement in milieu/groups/activities  4. Monitor for social isolation  2/26/2023 0018 by Whitley Steiner RN  Outcome: Progressing  Note: States \"my depression leveled out and is back to normal\". Rates her mood a 6-7 with 10 being the best. Affect flat, brightens. Good eye contact. States she is hopeful for the future. Good peer interaction. 2/25/2023 1514 by Sam Garcia RN  Note: Patient reports mood 6/10 with 10 being normal. Has flat affect. Speech clear. Good eye contact.  Reports hope for future and identifies family as their support system. Patient reports depression at present time. Problem: Sleep Disturbance  Goal: Will exhibit normal sleeping pattern  Description: INTERVENTIONS:  1. Administer medication as ordered  2. Decrease environmental stimuli, including noise, as appropriate  3. Discourage social isolation and naps during the day  2/26/2023 0018 by James Locke RN  Outcome: Progressing  Note: Slept 8.5 hours last evening and requested trazodone this shift. 2/25/2023 1514 by Ihsan Zamora RN  Outcome: Progressing  Note: Patient slept 8.5 hours last night, reports she slept well. Encourage patient not to take naps during the day, relax several hours before bed and to take prescribed sleep meds as ordered. Patient verbalized understanding. Problem: Anxiety  Goal: Will report anxiety at manageable levels  Description: INTERVENTIONS:  1. Administer medication as ordered  2. Teach and rehearse alternative coping skills  3. Provide emotional support with 1:1 interaction with staff  2/26/2023 0018 by James Locke RN  Outcome: Progressing  Flowsheets (Taken 2/25/2023 0800 by Ihsan Zamora RN)  Will report anxiety at manageable levels:   Teach and rehearse alternative coping skills   Provide emotional support with 1:1 interaction with staff  Note: States has \"some\" anxiety this shift but denied prn medication this shift. 2/25/2023 1514 by Ihsan Zamora RN  Outcome: Progressing  Flowsheets (Taken 2/25/2023 0800)  Will report anxiety at manageable levels:   Teach and rehearse alternative coping skills   Provide emotional support with 1:1 interaction with staff  Note: Patient reports denies anxiety.        Problem: Discharge Planning  Goal: Discharge to home or other facility with appropriate resources  2/26/2023 0018 by James Locke RN  Outcome: Progressing  Flowsheets (Taken 2/26/2023 0003)  Discharge to home or other facility with appropriate resources: Identify barriers to discharge with patient and caregiver  Note: Plans on going back to private residence and follow up at [facility]. 2/25/2023 1514 by Jenny Sullivan RN  Outcome: Progressing  Flowsheets (Taken 2/25/2023 0800)  Discharge to home or other facility with appropriate resources:   Identify barriers to discharge with patient and caregiver   Identify discharge learning needs (meds, wound care, etc)   Refer to discharge planning if patient needs post-hospital services based on physician order or complex needs related to functional status, cognitive ability or social support system  Note: Patient voices no needs before discharge. Patient plans to be discharged to home alone. Discharge planner working with patient to achieve optimal discharge plans, specific to individual needs. Problem: Coping  Goal: Pt/Family able to verbalize concerns and demonstrate effective coping strategies  Description: INTERVENTIONS:  1. Assist patient/family to identify coping skills, available support systems and cultural and spiritual values  2. Provide emotional support, including active listening and acknowledgement of concerns of patient and caregivers  3. Reduce environmental stimuli, as able  4. Instruct patient/family in relaxation techniques, as appropriate  5.  Assess for spiritual pain/suffering and initiate Spiritual Care, Psychosocial Clinical Specialist consults as needed  2/26/2023 0018 by Lorie Nguyen RN  Outcome: Progressing  Flowsheets (Taken 2/25/2023 0800 by Jenny Sullivan RN)  Patient/family able to verbalize anxieties, fears, and concerns, and demonstrate effective coping: Assist patient/family to identify coping skills, available support systems and cultural and spiritual values  2/25/2023 1514 by Jenny Sullivan RN  Outcome: Progressing  Flowsheets (Taken 2/25/2023 0800)  Patient/family able to verbalize anxieties, fears, and concerns, and demonstrate effective coping: Assist patient/family to identify coping skills, available support systems and cultural and spiritual values  Note: Patient reports drawing as coping skills. Patient is not attending therapeutic groups to gain insight on mental illness and learn positive coping skills. Problem: Chronic Conditions and Co-morbidities  Goal: Patient's chronic conditions and co-morbidity symptoms are monitored and maintained or improved  2/26/2023 0018 by Yoon Parrish RN  Outcome: Progressing  Flowsheets (Taken 2/25/2023 0800 by Cristiana Pina RN)  Care Plan - Patient's Chronic Conditions and Co-Morbidity Symptoms are Monitored and Maintained or Improved: Monitor and assess patient's chronic conditions and comorbid symptoms for stability, deterioration, or improvement  2/25/2023 1514 by Cristiana Pina RN  Outcome: Progressing  Flowsheets (Taken 2/25/2023 0800)  Care Plan - Patient's Chronic Conditions and Co-Morbidity Symptoms are Monitored and Maintained or Improved: Monitor and assess patient's chronic conditions and comorbid symptoms for stability, deterioration, or improvement     Problem: Pain  Goal: Verbalizes/displays adequate comfort level or baseline comfort level  Outcome: Progressing  Flowsheets (Taken 2/26/2023 0018)  Verbalizes/displays adequate comfort level or baseline comfort level:   Encourage patient to monitor pain and request assistance   Assess pain using appropriate pain scale  Note: Denies pain this shift.

## 2023-02-26 NOTE — PLAN OF CARE
Problem: Self Harm/Suicidality  Goal: Will have no self-injury during hospital stay  Description: INTERVENTIONS:  1. Ensure constant observer at bedside with Q15M safety checks  2. Maintain a safe environment  3. Secure patient belongings  4. Ensure family/visitors adhere to safety recommendations  5. Ensure safety tray has been added to patient's diet order  6. Every shift and PRN: Re-assess suicidal risk via Frequent Screener    2/26/2023 0844 by Mikie Eldridge RN  Outcome: Progressing  Note: No self harm behaviors were observed or reported so far this shift. Remains on every 15 minutes precautions for safety. Patient denies suicidal ideations at present time   2/26/2023 0018 by James Locke RN  Outcome: Progressing  Flowsheets (Taken 2/25/2023 0800 by Ihsan Zamora RN)  Will have no self-injury during hospital stay:   Maintain a safe environment   Every shift and PRN: Re-assess suicidal risk via Frequent Screener  Note: Denies suicidal ideations. No self harm behaviors or thoughts this shift. Problem: Depression  Goal: Will be euthymic at discharge  Description: INTERVENTIONS:  1. Administer medication as ordered  2. Provide emotional support via 1:1 interaction with staff  3. Encourage involvement in milieu/groups/activities  4. Monitor for social isolation  2/26/2023 0844 by Mikie Eldridge RN  Outcome: Progressing  2/26/2023 0018 by James Locke RN  Outcome: Progressing  Note: States \"my depression leveled out and is back to normal\". Rates her mood a 6-7 with 10 being the best. Affect flat, brightens. Good eye contact. States she is hopeful for the future. Good peer interaction. Problem: Sleep Disturbance  Goal: Will exhibit normal sleeping pattern  Description: INTERVENTIONS:  1. Administer medication as ordered  2. Decrease environmental stimuli, including noise, as appropriate  3.  Discourage social isolation and naps during the day  2/26/2023 0844 by Mikie Eldridge RN  Outcome: Progressing  Note: Slept 8 hours last night  2/26/2023 0018 by Debra Adams RN  Outcome: Progressing  Note: Slept 8.5 hours last evening and requested trazodone this shift. Problem: Anxiety  Goal: Will report anxiety at manageable levels  Description: INTERVENTIONS:  1. Administer medication as ordered  2. Teach and rehearse alternative coping skills  3. Provide emotional support with 1:1 interaction with staff  2/26/2023 0844 by Davida Child RN  Outcome: Progressing  Note: Denies feeling anxious at present. 2/26/2023 0018 by Debra Adams RN  Outcome: Progressing  Flowsheets (Taken 2/25/2023 0800 by Gerald Reyes RN)  Will report anxiety at manageable levels:   Teach and rehearse alternative coping skills   Provide emotional support with 1:1 interaction with staff  Note: States has \"some\" anxiety this shift but denied prn medication this shift. Problem: Discharge Planning  Goal: Discharge to home or other facility with appropriate resources  2/26/2023 0844 by Davida Child RN  Outcome: Progressing  Note: Plans to return home and follow up with [facility]. 2/26/2023 0018 by Debra Adams RN  Outcome: Progressing  Flowsheets (Taken 2/26/2023 0003)  Discharge to home or other facility with appropriate resources: Identify barriers to discharge with patient and caregiver  Note: Plans on going back to private residence and follow up at [facility]. Problem: Coping  Goal: Pt/Family able to verbalize concerns and demonstrate effective coping strategies  Description: INTERVENTIONS:  1. Assist patient/family to identify coping skills, available support systems and cultural and spiritual values  2. Provide emotional support, including active listening and acknowledgement of concerns of patient and caregivers  3. Reduce environmental stimuli, as able  4. Instruct patient/family in relaxation techniques, as appropriate  5.  Assess for spiritual pain/suffering and initiate Spiritual Care, Psychosocial Clinical Specialist consults as needed  2/26/2023 0844 by Zofia Rao RN  Outcome: Progressing  Note: Patient reports some coping skills. Patient is encouraged to attend therapeutic groups to gain insight on mental illness and learn positive coping skills. 2/26/2023 0018 by Carolyn Tobar RN  Outcome: Progressing  Flowsheets (Taken 2/25/2023 0800 by Fatoumata Adoron RN)  Patient/family able to verbalize anxieties, fears, and concerns, and demonstrate effective coping: Assist patient/family to identify coping skills, available support systems and cultural and spiritual values     Problem: Chronic Conditions and Co-morbidities  Goal: Patient's chronic conditions and co-morbidity symptoms are monitored and maintained or improved  2/26/2023 0844 by Zofia Rao RN  Outcome: Progressing  2/26/2023 0018 by Carolyn Tobar RN  Outcome: Progressing  Flowsheets (Taken 2/25/2023 0800 by Fatoumata Adorno RN)  Care Plan - Patient's Chronic Conditions and Co-Morbidity Symptoms are Monitored and Maintained or Improved: Monitor and assess patient's chronic conditions and comorbid symptoms for stability, deterioration, or improvement     Problem: Pain  Goal: Verbalizes/displays adequate comfort level or baseline comfort level  2/26/2023 0844 by Zofia Rao RN  Outcome: Progressing  Note: Denies pain when questioned. 2/26/2023 0018 by Carolyn Tobar RN  Outcome: Progressing  Flowsheets (Taken 2/26/2023 0018)  Verbalizes/displays adequate comfort level or baseline comfort level:   Encourage patient to monitor pain and request assistance   Assess pain using appropriate pain scale  Note: Denies pain this shift. Care plan reviewed with patient. Patient verbalize understanding of the plan of care and contribute to goal setting.

## 2023-02-26 NOTE — PROGRESS NOTES
Group Therapy Note    Date: 2/25/2023  Start Time: 2000  End Time:  2020  Number of Participants:     Type of Group: Wrap-Up    Wellness Binder Information  Module Name:    Session Number:      Patient's Goal:  to attend groups     Notes:  goal met     Status After Intervention:  Unchanged    Participation Level:  Active Listener and Interactive    Participation Quality: Appropriate      Speech:  normal      Thought Process/Content: Logical      Affective Functioning: Flat      Mood: anxious and depressed      Level of consciousness:  Alert      Response to Learning: Able to verbalize current knowledge/experience, Able to verbalize/acknowledge new learning, and Able to retain information      Endings: None Reported    Modes of Intervention: Support and Socialization      Discipline Responsible: Registered Nurse      Signature:  Sari Saleh RN

## 2023-02-26 NOTE — PROGRESS NOTES
Psychiatry Progress Note                                                  2-     CC: Suicidal ideation with plan and intent to hang self                                                                          Subjective     Progress:  Prefers to be called \"Jennifer\" states she continues to feel better every day. Reports depressed mood continues to be  less. Denies any suicidal ideations today. Denies any homicidal ideations. Reports meds still seem to be helpng. Denies having side effects. Good med compliance is verified. Reports ppetite and sleep continue to improve. Verified slept 8 hours and woke only once. . States she attended  groups yesterday. Denies getting any visits or talking to anyone on phone. States she did try to call her friend. but did not get answer      Objective  /72   Pulse 83   Temp (!) 96.4 °F (35.8 °C) (Oral)   Resp 16   Ht 5' (1.524 m)   Wt 197 lb (89.4 kg)   SpO2 97%   BMI 38.47 kg/m²       MSE:  Level of consciousness: Alert  Appearance: hospital attire, in chair and fair grooming   Behavior/Motor: Guarded   Attitude toward examiner: cooperative   Speech: Normal volume, circumstantial   Mood: Dysthymic but less  Affect: Reactive  Thought processes: Linear and goal directed   Suicidal Ideation: Denies suicidal ideations  Homicidal ideation: Denies homicidal ideations  Delusions: No evidence of delusions is observed  Perceptual Disturbance: Denies AH/VH  Cognition: Oriented to person, place, time and situation   Concentration fair   Memory intact   Insight: Limited  Judgment: Limited     Assessment:  Severe episode of recurrent major depressive disorder, without psychotic features ([facility] [address]   NARINDER  PTSD     Plan:  Continue current meds as ordered  Continue to encourage group attendance.         Renard Camacho CNP  2-    Major Depressive Disorder, severe recurrent without psychosis     I concur with above clinical findings and plan of care

## 2023-02-27 VITALS
HEART RATE: 82 BPM | OXYGEN SATURATION: 98 % | WEIGHT: 197 LBS | BODY MASS INDEX: 38.68 KG/M2 | TEMPERATURE: 97.6 F | RESPIRATION RATE: 16 BRPM | HEIGHT: 60 IN | DIASTOLIC BLOOD PRESSURE: 70 MMHG | SYSTOLIC BLOOD PRESSURE: 118 MMHG

## 2023-02-27 LAB
EKG ATRIAL RATE: 63 BPM
EKG P AXIS: 54 DEGREES
EKG P-R INTERVAL: 148 MS
EKG Q-T INTERVAL: 388 MS
EKG QRS DURATION: 86 MS
EKG QTC CALCULATION (BAZETT): 397 MS
EKG R AXIS: 40 DEGREES
EKG T AXIS: 34 DEGREES
EKG VENTRICULAR RATE: 63 BPM

## 2023-02-27 PROCEDURE — 6370000000 HC RX 637 (ALT 250 FOR IP): Performed by: PSYCHIATRY & NEUROLOGY

## 2023-02-27 PROCEDURE — 5130000000 HC BRIDGE APPOINTMENT

## 2023-02-27 PROCEDURE — 6370000000 HC RX 637 (ALT 250 FOR IP): Performed by: PHYSICIAN ASSISTANT

## 2023-02-27 RX ORDER — BUSPIRONE HYDROCHLORIDE 15 MG/1
15 TABLET ORAL 2 TIMES DAILY
Qty: 60 TABLET | Refills: 0 | Status: SHIPPED | OUTPATIENT
Start: 2023-02-27

## 2023-02-27 RX ADMIN — HYDROXYZINE HYDROCHLORIDE 50 MG: 25 TABLET, FILM COATED ORAL at 01:59

## 2023-02-27 RX ADMIN — BUSPIRONE HYDROCHLORIDE 15 MG: 7.5 TABLET ORAL at 08:26

## 2023-02-27 RX ADMIN — FLUOXETINE 60 MG: 20 CAPSULE ORAL at 08:26

## 2023-02-27 ASSESSMENT — PAIN SCALES - GENERAL: PAINLEVEL_OUTOF10: 0

## 2023-02-27 NOTE — BH NOTE
Behavioral Health   Discharge Note    Pt discharged with followings belongings:   Dental Appliances: None  Vision - Corrective Lenses: None  Hearing Aid: None  Jewelry: Earrings  Body Piercings Removed: No  Clothing: Footwear, Pants, Socks, Undergarments, Shirt  Other Valuables: Sudheer Frank, Personal Toiletries   Valuables retrieved from safe, security envelope number:  na and returned to patient. Patient left department with self via cab. Discharged to home. \"An Important Message from Medicare About Your Rights\" (IMM) form photocopy original from admission and provided to pt at least 4 hours prior to discharge N/A. If pt left within 4 hours of receiving 2nd delivery of IMM, this is because pt was agreeable with hospital discharge. Patient/guardian education on aftercare instructions: Yes  Bridge appointment completed:  yes. Reviewed Discharge Instructions with patient/family/nursing facility. Patient/family verbalizes understanding and agreement with the discharge plan using the teachback method. Patient/family verbalize understanding of AVS:Yes    Status EXAM upon discharge:  Mental Status and Behavioral Exam  Normal: Yes  Level of Assistance: Independent/Self  Facial Expression: Brightened  Affect: Appropriate  Level of Consciousness: Alert  Frequency of Checks: 4 times per hour, close  Mood:Normal: Yes  Mood: Worthless, low self-esteem  Motor Activity:Normal: Yes  Eye Contact: Good  Observed Behavior: Cooperative  Sexual Misconduct History: Current - no  Preception: [place] to person, [place] to time, [place] to place, [place] to situation  Attention:Normal: Yes  Thought Processes: Other (comment) (logical)  Thought Content:Normal: Yes  Depression Symptoms: No problems reported or observed. Anxiety Symptoms: No problems reported or observed. Mary Alice Symptoms: No problems reported or observed.   Hallucinations: None  Delusions: No  Memory:Normal: Yes  Insight and Judgment: No  Insight and Judgment: Poor judgment, Poor insight    Tj Apodaca RN

## 2023-02-27 NOTE — DISCHARGE INSTRUCTIONS
[employer] Hotline:  [phone]    Crisis phone numbers:  Luigi Shah, and [facility] GUARD [facility] [phone]. Dieter Gómez, and [facility] [address] [facility] [phone]. [facility] [phone]. [address] Marquita Gu, and [place] [phone]. [facility]  [address] [facility], [address] [facility]  [address]  [address] [facility] Professional Services  [facility] [address]  [address]  [facility], [address]  [place], [address]  [address]    [facility]  [address] [facility] [address]  [address] [facility] 22[address] [facility], [address] [facility]  [address] [facility]  Recovery and [facility]  [address] , [facility]  [phone]    [facility]  [address] [facility],[address], [address]  [address] [facility] [address]  Jose Eduardo, [address] [facility] [address]  [facility]  Stanislav [facility] 211  [address], [address] [facility]  Recovery and [facility]  [address], [address]  [phone]    [facility]  [address], [address]  [address]  [address]   [address]  [address] [facility]  [address]  [place], [address]  [address]uan [address]  [address]  [facility] 163   [facility], [address]  [phone]

## 2023-02-27 NOTE — PROGRESS NOTES
Group Therapy Note    Date: 02/26/23  Start Time: 2000  End Time:  2020  Number of Participants:     Type of Group: Wrap-Up    Wellness Binder Information  Module Name:    Session Number:      Patient's Goal:  to attend groups     Notes:  goal met     Status After Intervention:  Unchanged    Participation Level:  Active Listener and Interactive    Participation Quality: Appropriate      Speech:  normal      Thought Process/Content: Logical      Affective Functioning: Flat      Mood: euthymic      Level of consciousness:  Alert      Response to Learning: Able to verbalize current knowledge/experience, Able to verbalize/acknowledge new learning, and Able to retain information      Endings: None Reported    Modes of Intervention: Support and Socialization      Discipline Responsible: Registered Nurse      Signature:  Mike Art RN

## 2023-02-27 NOTE — PROGRESS NOTES
Discharge planning-Leeann has a follow up appointment for counseling with Ana Roldan on 03/03/23 at 10:00 am. Ad Pulliam has a follow up appointment for psychiatry with Dr Jeramy Cardenas on 03/28/23 at 3:30 PM.

## 2023-02-27 NOTE — PLAN OF CARE
Problem: Self Harm/Suicidality  Goal: Will have no self-injury during hospital stay  Description: INTERVENTIONS:  1. Ensure constant observer at bedside with Q15M safety checks  2. Maintain a safe environment  3. Secure patient belongings  4. Ensure family/visitors adhere to safety recommendations  5. Ensure safety tray has been added to patient's diet order  6. Every shift and PRN: Re-assess suicidal risk via Frequent Screener    Outcome: Progressing  Flowsheets (Taken 2/25/2023 0800 by Raman Street RN)  Will have no self-injury during hospital stay:   Maintain a safe environment   Every shift and PRN: Re-assess suicidal risk via Frequent Screener  Note: Denies suicidal ideations. No self harm behaviors or thoughts this shift. Problem: Depression  Goal: Will be euthymic at discharge  Description: INTERVENTIONS:  1. Administer medication as ordered  2. Provide emotional support via 1:1 interaction with staff  3. Encourage involvement in milieu/groups/activities  4. Monitor for social isolation  Outcome: Progressing  Note: Denies depression this shift. Rates her mood as \"good\". Affect flat but brightens. Good eye contact. States she is hopeful for the future. Good peer interaction. Problem: Sleep Disturbance  Goal: Will exhibit normal sleeping pattern  Description: INTERVENTIONS:  1. Administer medication as ordered  2. Decrease environmental stimuli, including noise, as appropriate  3. Discourage social isolation and naps during the day  Outcome: Progressing  Note: Slept 8 hours last evening and requested trazodone this shift. Problem: Anxiety  Goal: Will report anxiety at manageable levels  Description: INTERVENTIONS:  1. Administer medication as ordered  2. Teach and rehearse alternative coping skills  3.  Provide emotional support with 1:1 interaction with staff  Outcome: Progressing  Flowsheets (Taken 2/25/2023 0800 by Raman Street RN)  Will report anxiety at manageable levels: Teach and rehearse alternative coping skills   Provide emotional support with 1:1 interaction with staff  Note: Denies anxiety this shift. Problem: Discharge Planning  Goal: Discharge to home or other facility with appropriate resources  Outcome: Progressing  Flowsheets (Taken 2/27/2023 0011)  Discharge to home or other facility with appropriate resources: Identify barriers to discharge with patient and caregiver     Problem: Coping  Goal: Pt/Family able to verbalize concerns and demonstrate effective coping strategies  Description: INTERVENTIONS:  1. Assist patient/family to identify coping skills, available support systems and cultural and spiritual values  2. Provide emotional support, including active listening and acknowledgement of concerns of patient and caregivers  3. Reduce environmental stimuli, as able  4. Instruct patient/family in relaxation techniques, as appropriate  5. Assess for spiritual pain/suffering and initiate Spiritual Care, Psychosocial Clinical Specialist consults as needed  Outcome: Progressing     Problem: Chronic Conditions and Co-morbidities  Goal: Patient's chronic conditions and co-morbidity symptoms are monitored and maintained or improved  Flowsheets (Taken 2/25/2023 0800 by Ihsan Zamora RN)  Care Plan - Patient's Chronic Conditions and Co-Morbidity Symptoms are Monitored and Maintained or Improved: Monitor and assess patient's chronic conditions and comorbid symptoms for stability, deterioration, or improvement     Problem: Pain  Goal: Verbalizes/displays adequate comfort level or baseline comfort level  Outcome: Progressing  Flowsheets (Taken 2/26/2023 0018)  Verbalizes/displays adequate comfort level or baseline comfort level:   Encourage patient to monitor pain and request assistance   Assess pain using appropriate pain scale  Note: Denies pain this shift.

## 2023-02-27 NOTE — DISCHARGE INSTR - DIET

## 2023-02-27 NOTE — DISCHARGE SUMMARY
Provider Discharge Summary     Patient ID:  Elizabeth Tatum  [id number]  78 y.o.  2000    Admit date: 2/23/2023    Discharge date and time: 2/27/2023  2:16 PM     Admitting Physician: Trent Templeton MD     Discharge Physician: Trent Templeton MD    Admission Diagnoses: Depression, major, recurrent ([facility] [address] [F33.9]  Depression with suicidal ideation [F32. A, R45.851]    Discharge Diagnoses:      Severe episode of recurrent major depressive disorder, without psychotic features [facility])     Patient Active Problem List   Diagnosis Code    Depression with suicidal ideation F32. A, R45.851    Severe recurrent major depression without psychotic features ([facility]) F33.2    NARINDER (generalized anxiety disorder) F41.1    PTSD (post-traumatic stress disorder) F43.10    Suicidal ideation R45.851    Severe episode of recurrent major depressive disorder, without psychotic features [facility] F33.2        Admission Condition: poor    Discharged Condition: stable    Indication for Admission: threat to self    History of Present Illnes (present tense wording is of findings from admission exam and are not necessarily indicative of current findings):   Elizabeth Tatum is a 25 y.o. female with a history of depression, NARINDER, PTSD who presented to the emergency department voluntarily due to suicidal ideation with plan and intent to hang herself      Leeann reported that she has been having suicidal thoughts with a plan to hang herself for the last week. She mentions 4-5 months ago she attempted to hang herself but was not successful. She did not seek any help at that time. She reports she has been stressed out because she has been primarily caring for her 12and 15-year-old siblings. She lives by herself but her siblings will stay with her at times. She states when she asks her mom for a break from taking care of them, her mother basically gaslight her. She also reports she experienced a break-up in December.   Her ex is still on the lease because she cannot afford the apartment herself. He is not currently living there. She also reports a few days ago she was in [employer] and felt that someone was \"stalking\" her. She reports she has been feeling depressed for the last few weeks. Endorses feeling down and sad for more days than not. Denies anything bringing this episode of depression on a few weeks ago. She reports she has been having trouble staying asleep. She wakes up throughout the night. She gets about 6-7 hours of sleep. Appetite has been okay. Energy and motivation have been poor. She has been having trouble with attention and concentration. Endorses anhedonia with her hobbies such as drawing. She has been feeling worthless, hopeless and helpless. Patient also mentioned she self harmed and cut her wrist a few days ago. States it was a release for her. Leeann is somnolent but cooperative during the interview. She continues to feel depressed this morning. Endorses passive suicidal thoughts but denies any specific plan or intent at this time. She is able to contract for safety on the unit. She denies any hallucinations. No evidence of delusions or overt psychosis on examination. Hospital Course:   Upon admission, Bradford Esparza was provided a safe secure environment, introduced to unit milieu. Patient participated in groups and individual therapies. Meds were adjusted as noted below. After few days of hospital care, patient began to feel improvement. Depression lifted, thoughts to harm self ceased. Sleep improved, appetite was good. On morning rounds 2/27/2023, Bradford Esparza endorses feeling ready for discharge. Patient denies suicidal or homicidal ideations, denies hallucinations or delusions. Denies SE's from meds. It was decided that maximum benefit from hospital care had been achieved and patient can be discharged.      Consults:   None    Significant Diagnostic Studies: Routine labs and diagnostics    Treatments: Psychotropic medications, therapy with group, milieu, and 1:1 with nurses, social workers, CARLOS/CNP, and Attending physician. Discharge Medications:  Discharge Medication List as of 2/27/2023 11:45 AM        CONTINUE these medications which have CHANGED    Details   busPIRone (BUSPAR) 15 MG tablet Take 15 mg by mouth 2 times daily, Disp-60 tablet, R-0Normal           CONTINUE these medications which have NOT CHANGED    Details   !! FLUoxetine (PROZAC) 40 MG capsule Take 40 mg by mouth dailyHistorical Med      ARIPiprazole (ABILIFY) 10 MG tablet Take 10 mg by mouth at bedtimeHistorical Med      !! FLUoxetine (PROZAC) 20 MG capsule Take 1 capsule by mouth daily, Disp-7 capsule,R-3Normal      traZODone (DESYREL) 50 MG tablet Take 1 tablet by mouth nightly as needed for Sleep, Disp-7 tablet,R-3Normal       !! - Potential duplicate medications found. Please discuss with provider. STOP taking these medications       ibuprofen (ADVIL;MOTRIN) 600 MG tablet Comments:   Reason for Stopping:         omeprazole (PRILOSEC) 40 MG delayed release capsule Comments:   Reason for Stopping:                Core Measures statement:   Not applicable    Discharge Exam:  Level of consciousness:  Within normal limits  Appearance: Street clothes, seated, with good grooming  Behavior/Motor: No abnormalities noted  Attitude toward examiner:  Cooperative, attentive, good eye contact  Speech:  spontaneous, normal rate, normal volume and well articulated  Mood:  euthymic  Affect:  Full range  Thought processes:  linear, goal directed and coherent  Thought content:  denies homicidal ideation  Suicidal Ideation:  denies suicidal ideation  Delusions:  no evidence of delusions  Perceptual Disturbance:  denies any perceptual disturbance  Cognition:  Intact  Memory: age appropriate  Insight & Judgement: fair  Medication side effects: denies     Disposition: home    Patient Instructions:    Activity: activity as tolerated  1. Patient instructed to take medications regularly and follow up with outpatient appointments. Follow-up as scheduled with [facility]       Signed:    Electronically signed by Kenny Canchola MD on 2/27/23 at 2:16 PM EST    Time Spent on discharge is more than 33 minutes in the examination, evaluation, counseling and review of medications and discharge plan. Patient is evaluated by Violeta Perrin PA-C on the unit in person and I evaluated patient as Tele visit. Bello Braxton is a 25 y.o. female being evaluated by a Virtual Visit (video visit) encounter to address concerns as mentioned above. A caregiver was present in the room along with the patient. Patient is present at [address] [place] [address], [facility] and I am physically present at [place], [place]    --Kenny Canchola MD on 2/27/2023 at 2:16 PM    An electronic signature was used to authenticate this note. **This report has been created using voice recognition software. It may contain minor errors which are inherent in voice recognition technology. **

## 2023-02-28 ENCOUNTER — TELEPHONE (OUTPATIENT)
Dept: PSYCHIATRY | Age: 23
End: 2023-02-28

## 2023-03-09 RX ORDER — BUSPIRONE HYDROCHLORIDE 15 MG/1
TABLET ORAL
Qty: 60 TABLET | Refills: 0 | OUTPATIENT
Start: 2023-03-09

## 2023-03-30 ENCOUNTER — HOSPITAL ENCOUNTER (EMERGENCY)
Age: 23
Discharge: HOME | End: 2023-03-30
Payer: MEDICAID

## 2023-03-30 VITALS
TEMPERATURE: 98.7 F | HEIGHT: 60 IN | HEART RATE: 86 BPM | WEIGHT: 197 LBS | DIASTOLIC BLOOD PRESSURE: 75 MMHG | OXYGEN SATURATION: 97 % | BODY MASS INDEX: 38.68 KG/M2 | RESPIRATION RATE: 16 BRPM | SYSTOLIC BLOOD PRESSURE: 132 MMHG

## 2023-03-30 DIAGNOSIS — J01.90 ACUTE SINUSITIS, RECURRENCE NOT SPECIFIED, UNSPECIFIED LOCATION: Primary | ICD-10-CM

## 2023-03-30 LAB — S PYO AG THROAT QL: NEGATIVE

## 2023-03-30 PROCEDURE — 99213 OFFICE O/P EST LOW 20 MIN: CPT | Performed by: NURSE PRACTITIONER

## 2023-03-30 PROCEDURE — 87651 STREP A DNA AMP PROBE: CPT

## 2023-03-30 PROCEDURE — 99213 OFFICE O/P EST LOW 20 MIN: CPT

## 2023-03-30 RX ORDER — AMOXICILLIN AND CLAVULANATE POTASSIUM 875; 125 MG/1; MG/1
1 TABLET, FILM COATED ORAL 2 TIMES DAILY WITH MEALS
Qty: 20 TABLET | Refills: 0 | Status: SHIPPED | OUTPATIENT
Start: 2023-03-30 | End: 2023-04-09

## 2023-03-30 ASSESSMENT — ENCOUNTER SYMPTOMS
VOMITING: 0
DIARRHEA: 0
NAUSEA: 0
SORE THROAT: 1
SINUS PRESSURE: 0
ABDOMINAL PAIN: 0
COUGH: 0
SHORTNESS OF BREATH: 0

## 2023-03-30 ASSESSMENT — PAIN DESCRIPTION - ONSET: ONSET: GRADUAL

## 2023-03-30 ASSESSMENT — PAIN DESCRIPTION - PAIN TYPE: TYPE: ACUTE PAIN

## 2023-03-30 ASSESSMENT — PAIN - FUNCTIONAL ASSESSMENT
PAIN_FUNCTIONAL_ASSESSMENT: ACTIVITIES ARE NOT PREVENTED
PAIN_FUNCTIONAL_ASSESSMENT: 0-10

## 2023-03-30 ASSESSMENT — PAIN SCALES - GENERAL: PAINLEVEL_OUTOF10: 5

## 2023-03-30 ASSESSMENT — PAIN DESCRIPTION - LOCATION: LOCATION: THROAT

## 2023-03-30 ASSESSMENT — PAIN DESCRIPTION - DESCRIPTORS: DESCRIPTORS: ACHING;SORE

## 2023-03-30 ASSESSMENT — PAIN DESCRIPTION - FREQUENCY: FREQUENCY: CONTINUOUS

## 2023-03-30 NOTE — DISCHARGE INSTRUCTIONS
Suggestions for over-the-counter supplements to help your immune system, if you have questions regarding allergies or contraindications to use, please speak to the pharmacy staff or your family provider. Multi-vitamin/multi-mineral daily   Vitamin D3 1000 IU daily  Zinc 30 mg daily  Vitamin C 1000 mg twice daily  B-100 complex as daily as directed on bottle  Probiotic/Prebiotic daily    Gargle with mouthwash 3 times daily, may use Scope, Crest, or Listerine.

## 2023-03-30 NOTE — ED PROVIDER NOTES
[address] Encounter      CHIEFCOMPLAINT       Chief Complaint   Patient presents with    Pharyngitis       Nurses Notes reviewed and I agree except as noted in the HPI. HISTORY OF PRESENT ILLNESS   Gisela Castañeda is a 25 y.o. female who presents to the urgent care. She complains of a sore throat that started 1 week ago. The patient/patient representative has no other acute complaints at this time. REVIEW OF SYSTEMS     Review of Systems   Constitutional:  Negative for chills, fatigue and fever. HENT:  Positive for congestion, postnasal drip and sore throat. Negative for ear pain and sinus pressure. Respiratory:  Negative for cough and shortness of breath. Cardiovascular:  Negative for chest pain. Gastrointestinal:  Negative for abdominal pain, diarrhea, nausea and vomiting. Skin:  Negative for rash. Allergic/Immunologic: Negative for environmental allergies and food allergies. Neurological:  Negative for headaches. Hematological:  Negative for adenopathy. PAST MEDICAL HISTORY         Diagnosis Date    Anxiety     Depression     PTSD (post-traumatic stress disorder)        SURGICAL HISTORY     Patient  has no past surgical history on file. CURRENT MEDICATIONS       Previous Medications    ARIPIPRAZOLE (ABILIFY) 10 MG TABLET    Take 10 mg by mouth at bedtime    BUSPIRONE (BUSPAR) 15 MG TABLET    Take 15 mg by mouth 2 times daily    FLUOXETINE (PROZAC) 20 MG CAPSULE    Take 1 capsule by mouth daily    FLUOXETINE (PROZAC) 40 MG CAPSULE    Take 40 mg by mouth daily    TRAZODONE (DESYREL) 50 MG TABLET    Take 1 tablet by mouth nightly as needed for Sleep       ALLERGIES     Patient is is allergic to dill oil. FAMILY HISTORY     Patient'sfamily history includes Anxiety Disorder in her mother; Depression in her brother; Diabetes in her father. SOCIAL HISTORY     Patient  reports that she has never smoked.  She has never been exposed to 5' (1.524 m)       Medications - No data to display  PROCEDURES:  FINALIMPRESSION      1. Acute sinusitis, recurrence not specified, unspecified location        DISPOSITION/PLAN   DISPOSITION Decision To Discharge 03/30/2023 06:51:38 PM      ED Course as of 03/30/23 1853   Thu Mar 30, 2023   1853 Rapid Strep A Screen: NEGATIVE [HA]      ED Course User Index  [address] [facility], [facility]       Problem List Items Addressed This Visit    None  Visit Diagnoses       Acute sinusitis, recurrence not specified, unspecified location    -  Primary    Relevant Medications    amoxicillin-clavulanate (AUGMENTIN) 875-125 MG per tablet            Discussed findings with patient/patient representative and shared decision making was made with the patient/patient representative, and the patient will be discharged home in stable condition. I have given the patient/representative strict written and verbal instructions about care at home, including over-the-counter management, prescription information, follow-up, and signs and symptoms of worsening of condition, and the patient/patient representative did verbalize understanding of these instructions. Patient will go to nearest emergency department if symptoms change or worsen, or for any sign or symptom deemed emergent by the patient or family members. Follow up as an outpatient with PCP within the next 3 days, or sooner if symptoms warrant. PATIENT REFERRED TO:  [facility] [address],[address] [address] [facility] [address] [facility]  Schedule an appointment as soon as possible for a visit in 3 days  if you do not have a family provider, If symptoms change/worsen, go to the [address] [place] [address]    Please note that some or all of this chart was generated using [employer] voice recognition software.  Although every effort was made to ensure the accuracy of this automated transcription, some errors in

## 2023-11-26 ENCOUNTER — HOSPITAL ENCOUNTER (EMERGENCY)
Age: 23
Discharge: HOME | End: 2023-11-26
Payer: MEDICAID

## 2023-11-26 VITALS
SYSTOLIC BLOOD PRESSURE: 115 MMHG | WEIGHT: 189 LBS | HEART RATE: 93 BPM | OXYGEN SATURATION: 97 % | DIASTOLIC BLOOD PRESSURE: 78 MMHG | BODY MASS INDEX: 36.91 KG/M2 | TEMPERATURE: 98.3 F | RESPIRATION RATE: 18 BRPM

## 2023-11-26 DIAGNOSIS — R11.0 NAUSEA: Primary | ICD-10-CM

## 2023-11-26 LAB — HCG UR QL: NEGATIVE

## 2023-11-26 PROCEDURE — 93010 ELECTROCARDIOGRAM REPORT: CPT | Performed by: NUCLEAR MEDICINE

## 2023-11-26 PROCEDURE — 93005 ELECTROCARDIOGRAM TRACING: CPT | Performed by: NURSE PRACTITIONER

## 2023-11-26 PROCEDURE — 99213 OFFICE O/P EST LOW 20 MIN: CPT

## 2023-11-26 PROCEDURE — 99213 OFFICE O/P EST LOW 20 MIN: CPT | Performed by: NURSE PRACTITIONER

## 2023-11-26 PROCEDURE — 84703 CHORIONIC GONADOTROPIN ASSAY: CPT

## 2023-11-26 RX ORDER — ONDANSETRON 4 MG/1
4 TABLET, ORALLY DISINTEGRATING ORAL 3 TIMES DAILY PRN
Qty: 21 TABLET | Refills: 0 | Status: SHIPPED | OUTPATIENT
Start: 2023-11-26

## 2023-11-26 ASSESSMENT — PAIN SCALES - GENERAL: PAINLEVEL_OUTOF10: 4

## 2023-11-26 ASSESSMENT — ENCOUNTER SYMPTOMS
ROS SKIN COMMENTS: BREAST TENDERNESS
NAUSEA: 1

## 2023-11-26 ASSESSMENT — PAIN DESCRIPTION - ORIENTATION: ORIENTATION: MID

## 2023-11-26 ASSESSMENT — PAIN DESCRIPTION - LOCATION: LOCATION: CHEST

## 2023-11-26 ASSESSMENT — PAIN - FUNCTIONAL ASSESSMENT: PAIN_FUNCTIONAL_ASSESSMENT: 0-10

## 2023-11-26 NOTE — ED PROVIDER NOTES
[address] [facility] Encounter      Radha Figures       Chief Complaint   Patient presents with    Chest Pain       Nurses Notes reviewed and I agree except as noted in the HPI. HISTORY OF PRESENT ILLNESS   Pamela Sutton is a 21 y.o. female who presents to urgent care with complaints of breast tenderness, nausea, upset stomach. She states that she is concerned that she could be pregnant. She denies any active vomiting. Last menstrual cycle was late August.  She states that her menstrual cycles are generally irregular. She is not on any birth control. She is sexually active. REVIEW OF SYSTEMS     Review of Systems   Gastrointestinal:  Positive for nausea. Skin:         Breast tenderness       PAST MEDICAL HISTORY         Diagnosis Date    Anxiety     Depression     PTSD (post-traumatic stress disorder)        SURGICAL HISTORY     Patient  has no past surgical history on file. CURRENT MEDICATIONS       Discharge Medication List as of 11/26/2023  3:47 PM        CONTINUE these medications which have NOT CHANGED    Details   busPIRone (BUSPAR) 15 MG tablet Take 15 mg by mouth 2 times daily, Disp-60 tablet, R-0Normal      !! FLUoxetine (PROZAC) 40 MG capsule Take 40 mg by mouth dailyHistorical Med      ARIPiprazole (ABILIFY) 10 MG tablet Take 1.5 tablets by mouth at bedtimeHistorical Med      !! FLUoxetine (PROZAC) 20 MG capsule Take 1 capsule by mouth daily, Disp-7 capsule,R-3Normal      traZODone (DESYREL) 50 MG tablet Take 1 tablet by mouth nightly as needed for Sleep, Disp-7 tablet,R-3Normal       !! - Potential duplicate medications found. Please discuss with provider. ALLERGIES     Patient is is allergic to dill oil. FAMILY HISTORY     Patient'sfamily history includes Anxiety Disorder in her mother; Depression in her brother; Diabetes in her father. SOCIAL HISTORY     Patient  reports that she has never smoked. She has never been exposed to tobacco smoke. Value Ref Range    Pregnancy, Urine NEGATIVE NEGATIVE       IMAGING:  No orders to display     URGENT CARE COURSE:         Medications - No data to display  PROCEDURES:  FINALIMPRESSION      1. Nausea        DISPOSITION/PLAN   DISPOSITION Decision To Discharge 11/26/2023 03:43:19 PM    Pregnancy test is negative. Patient is nontoxic-appearing. A follow-up appointment was made for patient with primary care provider. Recommend close follow-up. Report to ER with new or severe symptoms. She denies questions.     PATIENT REFERRED TO:  Ramo Figueroa MD  [address] [place] [address]          DISCHARGE MEDICATIONS:  Discharge Medication List as of 11/26/2023  3:47 PM        START taking these medications    Details   ondansetron (ZOFRAN-ODT) 4 MG disintegrating tablet Take 1 tablet by mouth 3 times daily as needed for Nausea or Vomiting, Disp-21 tablet, R-0Normal           Discharge Medication List as of 11/26/2023  3:47 PM          KESHA Doyle CNP, APRN - CNP  11/26/23 1640

## 2023-11-26 NOTE — ED TRIAGE NOTES
Leeann arrives to room with complaint of  mid chest pain off and on for past 2 days  worsening today states she has anxiety, taking buspar took it today     no thoughts of suicide    Requesting a pregnancy test as well

## 2023-11-26 NOTE — DISCHARGE INSTRUCTIONS
Your pregnancy test is negative. Take medications for nausea as needed. An appointment has been made for you to follow-up with a primary care provider. Report to ER with new or severe symptoms.

## 2023-11-29 LAB
EKG ATRIAL RATE: 96 BPM
EKG P AXIS: 71 DEGREES
EKG P-R INTERVAL: 142 MS
EKG Q-T INTERVAL: 346 MS
EKG QRS DURATION: 82 MS
EKG QTC CALCULATION (BAZETT): 437 MS
EKG R AXIS: 62 DEGREES
EKG T AXIS: 57 DEGREES
EKG VENTRICULAR RATE: 96 BPM

## 2024-09-28 NOTE — GROUP NOTE
Group Therapy Note    Date: 2/25/2023    Group Start Time: 1330  Group End Time: 1400  Group Topic: Healthy Living/Wellness    [facility] 4[facility]    Shemar Walsh LPN        Group Therapy Note    Attendees: 6       Notes:  attended    Status After Intervention:  Improved    Participation Level:  Active Listener    Participation Quality: Appropriate and Attentive      Speech:  normal      Thought Process/Content: Logical      Affective Functioning: Flat      Level of consciousness:  Alert, Oriented x4, and Attentive      Response to Learning: Able to verbalize current knowledge/experience, Able to verbalize/acknowledge new learning, Able to retain information, and Capable of insight      Endings: None Reported    Modes of Intervention: Education      Discipline Responsible: Licensed Practical Nurse      Signature:  Shemar Walsh LPN
Group Therapy Note    Date: 2/26/2023    Group Start Time: 1330  Group End Time: 9965  Group Topic: Healthy Living/Wellness    [facility] 4[facility]    Rachel Proctor LPN        Group Therapy Note    Attendees: 7       Notes:  attended    Status After Intervention:  Improved    Participation Level:  Active Listener    Participation Quality: Appropriate and Attentive      Speech:  normal      Thought Process/Content: Logical      Affective Functioning: Flat      Level of consciousness:  Alert, Oriented x4, and Attentive      Response to Learning: Able to verbalize current knowledge/experience, Able to verbalize/acknowledge new learning, Able to retain information, and Capable of insight      Endings: None Reported    Modes of Intervention: Education and Socialization      Discipline Responsible: Licensed Practical Nurse      Signature:  Rachel Proctor LPN
Vaginal bleeding

## 2024-11-14 NOTE — ED PROVIDER NOTES
[FreeTextEntry1] : Ms. Fairchild is a 29 year old woman who presents today for ongoing shortness of breath.  As she is better if anything today, will continue current treatment and see her back in 6 months, and obtain basic asthma/allergy labs. List      CONTINUE these medications which have NOT CHANGED    Details   Etonogestrel (NEXPLANON SC) Inject into the skin      Cholecalciferol (VITAMIN D3) 5000 units TABS Take by mouth      clonazePAM (KLONOPIN) 0.5 MG tablet Take 0.5 mg by mouth 2 times daily as needed      prazosin (MINIPRESS) 1 MG capsule Take 1 mg by mouth nightly      sertraline (ZOLOFT) 25 MG tablet Take 25 mg by mouth daily             ALLERGIES     Patient is is allergic to dill oil. Patients   Immunization History   Administered Date(s) Administered    Tdap (Boostrix, Adacel) 07/02/2016       FAMILY HISTORY     Patient's family history includes Anxiety Disorder in her mother; Depression in her brother; Diabetes in her father. SOCIAL HISTORY     Patient  reports that she has never smoked. She has never used smokeless tobacco. She reports that she does not drink alcohol or use drugs. PHYSICAL EXAM     ED TRIAGE VITALS  BP: 123/72, Temp: 98.3 °F (36.8 °C), Heart Rate: 81, Resp: 16, SpO2: 100 %,Estimated body mass index is 17.95 kg/m² as calculated from the following:    Height as of 9/28/17: 5' 1\" (1.549 m). Weight as of 9/28/17: 95 lb (43.1 kg). ,No LMP recorded. Patient has had an implant. Physical Exam   Constitutional: She is oriented to person, place, and time. Vital signs are normal. She appears well-developed and well-nourished. She is cooperative. Non-toxic appearance. She does not have a sickly appearance. She does not appear ill. No distress. HENT:   Head: Normocephalic. Right Ear: External ear normal.   Left Ear: External ear normal.   Nose: Nose normal.   Neck: Normal range of motion and full passive range of motion without pain. Neck supple. Cardiovascular: Normal rate. Pulmonary/Chest: Effort normal.   Musculoskeletal: She exhibits tenderness. She exhibits no edema or deformity. Right hand: She exhibits decreased range of motion, tenderness and bony tenderness.  She exhibits normal capillary